# Patient Record
Sex: FEMALE | Race: WHITE | NOT HISPANIC OR LATINO | Employment: OTHER | URBAN - METROPOLITAN AREA
[De-identification: names, ages, dates, MRNs, and addresses within clinical notes are randomized per-mention and may not be internally consistent; named-entity substitution may affect disease eponyms.]

---

## 2019-02-19 ENCOUNTER — HOSPITAL ENCOUNTER (INPATIENT)
Facility: HOSPITAL | Age: 64
LOS: 2 days | Discharge: HOSPICE | DRG: 280 | End: 2019-02-21
Attending: EMERGENCY MEDICINE | Admitting: INTERNAL MEDICINE
Payer: MEDICARE

## 2019-02-19 DIAGNOSIS — I46.9 CARDIAC ARREST: Primary | ICD-10-CM

## 2019-02-19 DIAGNOSIS — R73.9 HYPERGLYCEMIA: ICD-10-CM

## 2019-02-19 DIAGNOSIS — R65.10 SIRS (SYSTEMIC INFLAMMATORY RESPONSE SYNDROME): ICD-10-CM

## 2019-02-19 DIAGNOSIS — I21.3 STEMI (ST ELEVATION MYOCARDIAL INFARCTION): ICD-10-CM

## 2019-02-19 DIAGNOSIS — E87.20 METABOLIC ACIDOSIS: ICD-10-CM

## 2019-02-19 DIAGNOSIS — R94.31 ABNORMAL ECG: ICD-10-CM

## 2019-02-19 DIAGNOSIS — A41.9 SEPSIS: ICD-10-CM

## 2019-02-19 DIAGNOSIS — R79.89 ELEVATED TROPONIN: ICD-10-CM

## 2019-02-19 DIAGNOSIS — Z45.2 ENCOUNTER FOR CENTRAL LINE PLACEMENT: ICD-10-CM

## 2019-02-19 DIAGNOSIS — J96.90 RESPIRATORY FAILURE, UNSPECIFIED CHRONICITY, UNSPECIFIED WHETHER WITH HYPOXIA OR HYPERCAPNIA: ICD-10-CM

## 2019-02-19 DIAGNOSIS — D64.9 ANEMIA, UNSPECIFIED TYPE: ICD-10-CM

## 2019-02-19 PROBLEM — D72.829 LEUCOCYTOSIS: Status: ACTIVE | Noted: 2019-02-19

## 2019-02-19 PROBLEM — G93.1 ACUTE ANOXIC ENCEPHALOPATHY: Status: ACTIVE | Noted: 2019-02-19

## 2019-02-19 LAB
ACANTHOCYTES BLD QL SMEAR: PRESENT
ALBUMIN SERPL BCP-MCNC: 2.9 G/DL
ALLENS TEST: ABNORMAL
ALP SERPL-CCNC: 154 U/L
ALT SERPL W/O P-5'-P-CCNC: 17 U/L
AMPHET+METHAMPHET UR QL: NEGATIVE
ANION GAP SERPL CALC-SCNC: 11 MMOL/L
ANION GAP SERPL CALC-SCNC: 17 MMOL/L
ANISOCYTOSIS BLD QL SMEAR: SLIGHT
APTT BLDCRRT: 21 SEC
AST SERPL-CCNC: 55 U/L
BACTERIA #/AREA URNS HPF: NORMAL /HPF
BARBITURATES UR QL SCN>200 NG/ML: NEGATIVE
BASOPHILS # BLD AUTO: 0.05 K/UL
BASOPHILS NFR BLD: 0.2 %
BENZODIAZ UR QL SCN>200 NG/ML: NEGATIVE
BILIRUB SERPL-MCNC: 0.2 MG/DL
BILIRUB UR QL STRIP: NEGATIVE
BNP SERPL-MCNC: 19 PG/ML
BUN SERPL-MCNC: 15 MG/DL
BUN SERPL-MCNC: 17 MG/DL
BURR CELLS BLD QL SMEAR: ABNORMAL
BZE UR QL SCN: NEGATIVE
CALCIUM SERPL-MCNC: 7.7 MG/DL
CALCIUM SERPL-MCNC: 9.4 MG/DL
CANNABINOIDS UR QL SCN: NEGATIVE
CHLORIDE SERPL-SCNC: 105 MMOL/L
CHLORIDE SERPL-SCNC: 109 MMOL/L
CK SERPL-CCNC: 263 U/L
CLARITY UR: CLEAR
CO2 SERPL-SCNC: 13 MMOL/L
CO2 SERPL-SCNC: 16 MMOL/L
COLOR UR: YELLOW
CREAT SERPL-MCNC: 0.9 MG/DL
CREAT SERPL-MCNC: 1 MG/DL
CREAT UR-MCNC: 119.8 MG/DL
DELSYS: ABNORMAL
DIFFERENTIAL METHOD: ABNORMAL
EOSINOPHIL # BLD AUTO: 0.1 K/UL
EOSINOPHIL NFR BLD: 0.3 %
ERYTHROCYTE [DISTWIDTH] IN BLOOD BY AUTOMATED COUNT: 19.3 %
ERYTHROCYTE [SEDIMENTATION RATE] IN BLOOD BY WESTERGREN METHOD: 18 MM/H
EST. GFR  (AFRICAN AMERICAN): >60 ML/MIN/1.73 M^2
EST. GFR  (AFRICAN AMERICAN): >60 ML/MIN/1.73 M^2
EST. GFR  (NON AFRICAN AMERICAN): >60 ML/MIN/1.73 M^2
EST. GFR  (NON AFRICAN AMERICAN): >60 ML/MIN/1.73 M^2
FIO2: 100
GLUCOSE SERPL-MCNC: 307 MG/DL
GLUCOSE SERPL-MCNC: 311 MG/DL
GLUCOSE SERPL-MCNC: 344 MG/DL
GLUCOSE UR QL STRIP: ABNORMAL
HCO3 UR-SCNC: 16.4 MMOL/L (ref 24–28)
HCT VFR BLD AUTO: 30.9 %
HGB BLD-MCNC: 8.2 G/DL
HGB UR QL STRIP: ABNORMAL
HYALINE CASTS #/AREA URNS LPF: 1 /LPF
HYPOCHROMIA BLD QL SMEAR: ABNORMAL
INR PPP: 1
KETONES UR QL STRIP: NEGATIVE
LACTATE SERPL-SCNC: 10.8 MMOL/L
LACTATE SERPL-SCNC: 4.2 MMOL/L
LEUKOCYTE ESTERASE UR QL STRIP: NEGATIVE
LYMPHOCYTES # BLD AUTO: 3.9 K/UL
LYMPHOCYTES NFR BLD: 15.9 %
MAGNESIUM SERPL-MCNC: 1.9 MG/DL
MCH RBC QN AUTO: 21.4 PG
MCHC RBC AUTO-ENTMCNC: 26.5 G/DL
MCV RBC AUTO: 81 FL
METHADONE UR QL SCN>300 NG/ML: NEGATIVE
MICROSCOPIC COMMENT: NORMAL
MODE: ABNORMAL
MONOCYTES # BLD AUTO: 0.9 K/UL
MONOCYTES NFR BLD: 3.7 %
NEUTROPHILS # BLD AUTO: 19.4 K/UL
NEUTROPHILS NFR BLD: 82.2 %
NITRITE UR QL STRIP: NEGATIVE
OPIATES UR QL SCN: NORMAL
OVALOCYTES BLD QL SMEAR: ABNORMAL
PCO2 BLDA: 58.6 MMHG (ref 35–45)
PCP UR QL SCN>25 NG/ML: NEGATIVE
PEEP: 5
PH SMN: 7.05 [PH] (ref 7.35–7.45)
PH UR STRIP: 6 [PH] (ref 5–8)
PHOSPHATE SERPL-MCNC: 3.4 MG/DL
PLATELET # BLD AUTO: 520 K/UL
PLATELET BLD QL SMEAR: ABNORMAL
PMV BLD AUTO: 9.4 FL
PO2 BLDA: 473 MMHG (ref 80–100)
POC BE: -14 MMOL/L
POC SATURATED O2: 100 % (ref 95–100)
POCT GLUCOSE: 320 MG/DL (ref 70–110)
POIKILOCYTOSIS BLD QL SMEAR: SLIGHT
POLYCHROMASIA BLD QL SMEAR: ABNORMAL
POTASSIUM SERPL-SCNC: 3.4 MMOL/L
POTASSIUM SERPL-SCNC: 5 MMOL/L
PROCALCITONIN SERPL IA-MCNC: 0.07 NG/ML
PROT SERPL-MCNC: 6.9 G/DL
PROT UR QL STRIP: ABNORMAL
PROTHROMBIN TIME: 10.7 SEC
RBC # BLD AUTO: 3.84 M/UL
RBC #/AREA URNS HPF: 3 /HPF (ref 0–4)
SAMPLE: ABNORMAL
SITE: ABNORMAL
SODIUM SERPL-SCNC: 135 MMOL/L
SODIUM SERPL-SCNC: 136 MMOL/L
SP GR UR STRIP: >=1.03 (ref 1–1.03)
SP02: 100
SQUAMOUS #/AREA URNS HPF: 6 /HPF
TOXICOLOGY INFORMATION: NORMAL
TROPONIN I SERPL DL<=0.01 NG/ML-MCNC: 0.68 NG/ML
TROPONIN I SERPL DL<=0.01 NG/ML-MCNC: 6.09 NG/ML
URN SPEC COLLECT METH UR: ABNORMAL
UROBILINOGEN UR STRIP-ACNC: NEGATIVE EU/DL
VT: 400
WBC # BLD AUTO: 24.28 K/UL
WBC #/AREA URNS HPF: 0 /HPF (ref 0–5)

## 2019-02-19 PROCEDURE — 85025 COMPLETE CBC W/AUTO DIFF WBC: CPT

## 2019-02-19 PROCEDURE — 96361 HYDRATE IV INFUSION ADD-ON: CPT

## 2019-02-19 PROCEDURE — 83605 ASSAY OF LACTIC ACID: CPT

## 2019-02-19 PROCEDURE — 84100 ASSAY OF PHOSPHORUS: CPT

## 2019-02-19 PROCEDURE — 80053 COMPREHEN METABOLIC PANEL: CPT

## 2019-02-19 PROCEDURE — 87040 BLOOD CULTURE FOR BACTERIA: CPT

## 2019-02-19 PROCEDURE — 80048 BASIC METABOLIC PNL TOTAL CA: CPT

## 2019-02-19 PROCEDURE — 85730 THROMBOPLASTIN TIME PARTIAL: CPT

## 2019-02-19 PROCEDURE — 99900035 HC TECH TIME PER 15 MIN (STAT)

## 2019-02-19 PROCEDURE — 87205 SMEAR GRAM STAIN: CPT

## 2019-02-19 PROCEDURE — 83880 ASSAY OF NATRIURETIC PEPTIDE: CPT

## 2019-02-19 PROCEDURE — 27100108

## 2019-02-19 PROCEDURE — 96366 THER/PROPH/DIAG IV INF ADDON: CPT

## 2019-02-19 PROCEDURE — 93010 EKG 12-LEAD: ICD-10-PCS | Mod: 76,,, | Performed by: INTERNAL MEDICINE

## 2019-02-19 PROCEDURE — 36600 WITHDRAWAL OF ARTERIAL BLOOD: CPT

## 2019-02-19 PROCEDURE — 81000 URINALYSIS NONAUTO W/SCOPE: CPT | Mod: 59

## 2019-02-19 PROCEDURE — 36415 COLL VENOUS BLD VENIPUNCTURE: CPT

## 2019-02-19 PROCEDURE — 83735 ASSAY OF MAGNESIUM: CPT

## 2019-02-19 PROCEDURE — 96365 THER/PROPH/DIAG IV INF INIT: CPT

## 2019-02-19 PROCEDURE — 63600175 PHARM REV CODE 636 W HCPCS

## 2019-02-19 PROCEDURE — 63600175 PHARM REV CODE 636 W HCPCS: Performed by: EMERGENCY MEDICINE

## 2019-02-19 PROCEDURE — 36620 INSERTION CATHETER ARTERY: CPT

## 2019-02-19 PROCEDURE — 80307 DRUG TEST PRSMV CHEM ANLYZR: CPT

## 2019-02-19 PROCEDURE — 85610 PROTHROMBIN TIME: CPT

## 2019-02-19 PROCEDURE — 82803 BLOOD GASES ANY COMBINATION: CPT

## 2019-02-19 PROCEDURE — 84484 ASSAY OF TROPONIN QUANT: CPT | Mod: 91

## 2019-02-19 PROCEDURE — 25000003 PHARM REV CODE 250: Performed by: EMERGENCY MEDICINE

## 2019-02-19 PROCEDURE — 82550 ASSAY OF CK (CPK): CPT

## 2019-02-19 PROCEDURE — 83605 ASSAY OF LACTIC ACID: CPT | Mod: 91

## 2019-02-19 PROCEDURE — 93010 ELECTROCARDIOGRAM REPORT: CPT | Mod: 76,,, | Performed by: INTERNAL MEDICINE

## 2019-02-19 PROCEDURE — 31500 INSERT EMERGENCY AIRWAY: CPT

## 2019-02-19 PROCEDURE — 94002 VENT MGMT INPAT INIT DAY: CPT

## 2019-02-19 PROCEDURE — 11000001 HC ACUTE MED/SURG PRIVATE ROOM

## 2019-02-19 PROCEDURE — 93010 ELECTROCARDIOGRAM REPORT: CPT | Mod: ,,, | Performed by: INTERNAL MEDICINE

## 2019-02-19 PROCEDURE — 25000003 PHARM REV CODE 250

## 2019-02-19 PROCEDURE — 27201258 HC MOISTURE TRAP-END TIDAL C02

## 2019-02-19 PROCEDURE — 84145 PROCALCITONIN (PCT): CPT

## 2019-02-19 PROCEDURE — 83520 IMMUNOASSAY QUANT NOS NONAB: CPT

## 2019-02-19 PROCEDURE — 82947 ASSAY GLUCOSE BLOOD QUANT: CPT

## 2019-02-19 PROCEDURE — 27200966 HC CLOSED SUCTION SYSTEM

## 2019-02-19 PROCEDURE — 96375 TX/PRO/DX INJ NEW DRUG ADDON: CPT

## 2019-02-19 PROCEDURE — 87070 CULTURE OTHR SPECIMN AEROBIC: CPT

## 2019-02-19 PROCEDURE — 99285 EMERGENCY DEPT VISIT HI MDM: CPT | Mod: 25

## 2019-02-19 RX ORDER — ETOMIDATE 2 MG/ML
20 INJECTION INTRAVENOUS
Status: COMPLETED | OUTPATIENT
Start: 2019-02-19 | End: 2019-02-19

## 2019-02-19 RX ORDER — FENTANYL CITRAT/DEXTROSE 5%/PF 100 MCG/10
50 PATIENT CONTROLLED ANALGESIA SYRINGE INTRAVENOUS CONTINUOUS
Status: DISCONTINUED | OUTPATIENT
Start: 2019-02-19 | End: 2019-02-20

## 2019-02-19 RX ORDER — ASPIRIN 325 MG
325 TABLET ORAL
Status: COMPLETED | OUTPATIENT
Start: 2019-02-19 | End: 2019-02-19

## 2019-02-19 RX ORDER — CIPROFLOXACIN 2 MG/ML
400 INJECTION, SOLUTION INTRAVENOUS
Status: DISCONTINUED | OUTPATIENT
Start: 2019-02-19 | End: 2019-02-20

## 2019-02-19 RX ORDER — MAGNESIUM SULFATE HEPTAHYDRATE 40 MG/ML
0.5 INJECTION, SOLUTION INTRAVENOUS CONTINUOUS
Status: DISCONTINUED | OUTPATIENT
Start: 2019-02-19 | End: 2019-02-19

## 2019-02-19 RX ORDER — BUSPIRONE HYDROCHLORIDE 10 MG/1
30 TABLET ORAL EVERY 8 HOURS
Status: DISCONTINUED | OUTPATIENT
Start: 2019-02-19 | End: 2019-02-20

## 2019-02-19 RX ORDER — CISATRACURIUM BESYLATE 10 MG/ML
0.1 INJECTION, SOLUTION INTRAVENOUS ONCE
Status: DISCONTINUED | OUTPATIENT
Start: 2019-02-19 | End: 2019-02-20 | Stop reason: ALTCHOICE

## 2019-02-19 RX ORDER — PROPOFOL 10 MG/ML
30 INJECTION, EMULSION INTRAVENOUS CONTINUOUS
Status: DISCONTINUED | OUTPATIENT
Start: 2019-02-19 | End: 2019-02-20

## 2019-02-19 RX ORDER — SUCCINYLCHOLINE CHLORIDE 20 MG/ML
100 INJECTION INTRAMUSCULAR; INTRAVENOUS
Status: COMPLETED | OUTPATIENT
Start: 2019-02-19 | End: 2019-02-19

## 2019-02-19 RX ORDER — MAGNESIUM SULFATE HEPTAHYDRATE 40 MG/ML
2 INJECTION, SOLUTION INTRAVENOUS
Status: COMPLETED | OUTPATIENT
Start: 2019-02-19 | End: 2019-02-20

## 2019-02-19 RX ORDER — ACETAMINOPHEN 650 MG/20.3ML
650 LIQUID ORAL EVERY 6 HOURS
Status: DISCONTINUED | OUTPATIENT
Start: 2019-02-20 | End: 2019-02-20

## 2019-02-19 RX ORDER — BUSPIRONE HYDROCHLORIDE 10 MG/1
30 TABLET ORAL ONCE
Status: COMPLETED | OUTPATIENT
Start: 2019-02-19 | End: 2019-02-19

## 2019-02-19 RX ADMIN — PROPOFOL 30 MCG/KG/MIN: 10 INJECTION, EMULSION INTRAVENOUS at 11:02

## 2019-02-19 RX ADMIN — ETOMIDATE 20 MG: 2 INJECTION INTRAVENOUS at 07:02

## 2019-02-19 RX ADMIN — ASPIRIN 325 MG ORAL TABLET 325 MG: 325 PILL ORAL at 11:02

## 2019-02-19 RX ADMIN — ACETAMINOPHEN 650 MG: 650 SOLUTION ORAL at 11:02

## 2019-02-19 RX ADMIN — SUCCINYLCHOLINE CHLORIDE 100 MG: 20 INJECTION, SOLUTION INTRAMUSCULAR; INTRAVENOUS at 07:02

## 2019-02-19 RX ADMIN — SODIUM CHLORIDE 3000 ML: 0.9 INJECTION, SOLUTION INTRAVENOUS at 08:02

## 2019-02-19 RX ADMIN — VANCOMYCIN HYDROCHLORIDE 1500 MG: 1 INJECTION, POWDER, LYOPHILIZED, FOR SOLUTION INTRAVENOUS at 10:02

## 2019-02-19 RX ADMIN — CIPROFLOXACIN 400 MG: 2 INJECTION, SOLUTION INTRAVENOUS at 09:02

## 2019-02-19 RX ADMIN — LORAZEPAM 2 MG: 2 INJECTION INTRAMUSCULAR; INTRAVENOUS at 10:02

## 2019-02-19 RX ADMIN — PIPERACILLIN AND TAZOBACTAM 4.5 G: 4; .5 INJECTION, POWDER, LYOPHILIZED, FOR SOLUTION INTRAVENOUS; PARENTERAL at 09:02

## 2019-02-19 RX ADMIN — BUSPIRONE HYDROCHLORIDE 30 MG: 10 TABLET ORAL at 10:02

## 2019-02-19 RX ADMIN — Medication 50 MCG/HR: at 10:02

## 2019-02-20 PROBLEM — Z99.11 ON MECHANICALLY ASSISTED VENTILATION: Status: ACTIVE | Noted: 2019-02-20

## 2019-02-20 LAB
ABO + RH BLD: NORMAL
ACANTHOCYTES BLD QL SMEAR: PRESENT
ALBUMIN SERPL BCP-MCNC: 2.3 G/DL
ALLENS TEST: ABNORMAL
ALP SERPL-CCNC: 107 U/L
ALT SERPL W/O P-5'-P-CCNC: 32 U/L
ANION GAP SERPL CALC-SCNC: 5 MMOL/L
ANION GAP SERPL CALC-SCNC: 5 MMOL/L
ANION GAP SERPL CALC-SCNC: 8 MMOL/L
ANION GAP SERPL CALC-SCNC: 9 MMOL/L
ANISOCYTOSIS BLD QL SMEAR: SLIGHT
APTT BLDCRRT: 35.6 SEC
APTT BLDCRRT: 74.3 SEC
APTT BLDCRRT: 78.6 SEC
APTT BLDCRRT: 78.6 SEC
AST SERPL-CCNC: 103 U/L
BASOPHILS # BLD AUTO: 0.01 K/UL
BASOPHILS NFR BLD: 0 %
BILIRUB SERPL-MCNC: 0.2 MG/DL
BLD GP AB SCN CELLS X3 SERPL QL: NORMAL
BLD PROD TYP BPU: NORMAL
BLOOD UNIT EXPIRATION DATE: NORMAL
BLOOD UNIT TYPE CODE: 6200
BLOOD UNIT TYPE: NORMAL
BUN SERPL-MCNC: 17 MG/DL
BUN SERPL-MCNC: 17 MG/DL
BUN SERPL-MCNC: 18 MG/DL
BUN SERPL-MCNC: 18 MG/DL
BURR CELLS BLD QL SMEAR: ABNORMAL
CALCIUM SERPL-MCNC: 7.7 MG/DL
CALCIUM SERPL-MCNC: 7.7 MG/DL
CALCIUM SERPL-MCNC: 8.1 MG/DL
CALCIUM SERPL-MCNC: 8.5 MG/DL
CHLORIDE SERPL-SCNC: 110 MMOL/L
CHLORIDE SERPL-SCNC: 111 MMOL/L
CHLORIDE SERPL-SCNC: 111 MMOL/L
CHLORIDE SERPL-SCNC: 112 MMOL/L
CO2 SERPL-SCNC: 16 MMOL/L
CO2 SERPL-SCNC: 16 MMOL/L
CO2 SERPL-SCNC: 17 MMOL/L
CO2 SERPL-SCNC: 17 MMOL/L
CODING SYSTEM: NORMAL
CREAT SERPL-MCNC: 0.6 MG/DL
CREAT SERPL-MCNC: 0.6 MG/DL
CREAT SERPL-MCNC: 0.8 MG/DL
CREAT SERPL-MCNC: 0.8 MG/DL
DACRYOCYTES BLD QL SMEAR: ABNORMAL
DELSYS: ABNORMAL
DIASTOLIC DYSFUNCTION: NO
DIFFERENTIAL METHOD: ABNORMAL
DISPENSE STATUS: NORMAL
EOSINOPHIL # BLD AUTO: 0 K/UL
EOSINOPHIL NFR BLD: 0 %
ERYTHROCYTE [DISTWIDTH] IN BLOOD BY AUTOMATED COUNT: 18.9 %
ERYTHROCYTE [SEDIMENTATION RATE] IN BLOOD BY WESTERGREN METHOD: 22 MM/H
EST. GFR  (AFRICAN AMERICAN): >60 ML/MIN/1.73 M^2
EST. GFR  (NON AFRICAN AMERICAN): >60 ML/MIN/1.73 M^2
ESTIMATED PA SYSTOLIC PRESSURE: 41.83
FERRITIN SERPL-MCNC: 42 NG/ML
FIO2: 50
GIANT PLATELETS BLD QL SMEAR: PRESENT
GLUCOSE SERPL-MCNC: 110 MG/DL
GLUCOSE SERPL-MCNC: 119 MG/DL
GLUCOSE SERPL-MCNC: 119 MG/DL
GLUCOSE SERPL-MCNC: 148 MG/DL
HCO3 UR-SCNC: 16.4 MMOL/L (ref 24–28)
HCT VFR BLD AUTO: 26.6 %
HGB BLD-MCNC: 7.4 G/DL
HGB BLD-MCNC: 7.7 G/DL
HYPOCHROMIA BLD QL SMEAR: ABNORMAL
INR PPP: 1.1
IRON SERPL-MCNC: 20 UG/DL
LACTATE SERPL-SCNC: 1.9 MMOL/L
LACTATE SERPL-SCNC: 4 MMOL/L
LYMPHOCYTES # BLD AUTO: 0.7 K/UL
LYMPHOCYTES NFR BLD: 3.5 %
MAGNESIUM SERPL-MCNC: 1.9 MG/DL
MAGNESIUM SERPL-MCNC: 2.2 MG/DL
MAGNESIUM SERPL-MCNC: 2.5 MG/DL
MCH RBC QN AUTO: 22.5 PG
MCHC RBC AUTO-ENTMCNC: 28.9 G/DL
MCV RBC AUTO: 78 FL
MITRAL VALVE REGURGITATION: ABNORMAL
MODE: ABNORMAL
MONOCYTES # BLD AUTO: 0.9 K/UL
MONOCYTES NFR BLD: 4.2 %
NEUTROPHILS # BLD AUTO: 19.3 K/UL
NEUTROPHILS NFR BLD: 92.6 %
NUM UNITS TRANS PACKED RBC: NORMAL
OVALOCYTES BLD QL SMEAR: ABNORMAL
PCO2 BLDA: 38.2 MMHG (ref 35–45)
PEEP: 5
PH SMN: 7.24 [PH] (ref 7.35–7.45)
PHOSPHATE SERPL-MCNC: 2.3 MG/DL
PHOSPHATE SERPL-MCNC: 3.2 MG/DL
PHOSPHATE SERPL-MCNC: 3.7 MG/DL
PLATELET # BLD AUTO: 454 K/UL
PLATELET BLD QL SMEAR: ABNORMAL
PMV BLD AUTO: 8.4 FL
PO2 BLDA: 172 MMHG (ref 80–100)
POC BE: -11 MMOL/L
POC SATURATED O2: 99 % (ref 95–100)
POCT GLUCOSE: 102 MG/DL (ref 70–110)
POCT GLUCOSE: 107 MG/DL (ref 70–110)
POCT GLUCOSE: 108 MG/DL (ref 70–110)
POCT GLUCOSE: 110 MG/DL (ref 70–110)
POCT GLUCOSE: 118 MG/DL (ref 70–110)
POCT GLUCOSE: 144 MG/DL (ref 70–110)
POCT GLUCOSE: 175 MG/DL (ref 70–110)
POIKILOCYTOSIS BLD QL SMEAR: ABNORMAL
POLYCHROMASIA BLD QL SMEAR: ABNORMAL
POTASSIUM SERPL-SCNC: 3 MMOL/L
POTASSIUM SERPL-SCNC: 3.8 MMOL/L
POTASSIUM SERPL-SCNC: 3.8 MMOL/L
POTASSIUM SERPL-SCNC: 3.9 MMOL/L
PROT SERPL-MCNC: 5.1 G/DL
PROTHROMBIN TIME: 11.9 SEC
RBC # BLD AUTO: 3.42 M/UL
RETIRED EF AND QEF - SEE NOTES: 55 (ref 55–65)
SAMPLE: ABNORMAL
SATURATED IRON: 6 %
SITE: ABNORMAL
SODIUM SERPL-SCNC: 133 MMOL/L
SODIUM SERPL-SCNC: 133 MMOL/L
SODIUM SERPL-SCNC: 135 MMOL/L
SODIUM SERPL-SCNC: 136 MMOL/L
TARGETS BLD QL SMEAR: ABNORMAL
TOTAL IRON BINDING CAPACITY: 323 UG/DL
TRANSFERRIN SERPL-MCNC: 218 MG/DL
TRICUSPID VALVE REGURGITATION: ABNORMAL
VT: 400
WBC # BLD AUTO: 20.9 K/UL

## 2019-02-20 PROCEDURE — 83605 ASSAY OF LACTIC ACID: CPT

## 2019-02-20 PROCEDURE — 63600175 PHARM REV CODE 636 W HCPCS: Performed by: EMERGENCY MEDICINE

## 2019-02-20 PROCEDURE — 99223 PR INITIAL HOSPITAL CARE,LEVL III: ICD-10-PCS | Mod: ,,, | Performed by: INTERNAL MEDICINE

## 2019-02-20 PROCEDURE — 99900035 HC TECH TIME PER 15 MIN (STAT)

## 2019-02-20 PROCEDURE — 93010 ELECTROCARDIOGRAM REPORT: CPT | Mod: ,,, | Performed by: INTERNAL MEDICINE

## 2019-02-20 PROCEDURE — S0028 INJECTION, FAMOTIDINE, 20 MG: HCPCS | Performed by: NURSE PRACTITIONER

## 2019-02-20 PROCEDURE — 99199 UNLISTED SPECIAL SVC PX/RPRT: CPT

## 2019-02-20 PROCEDURE — 94003 VENT MGMT INPAT SUBQ DAY: CPT

## 2019-02-20 PROCEDURE — 93010 EKG 12-LEAD: ICD-10-PCS | Mod: ,,, | Performed by: INTERNAL MEDICINE

## 2019-02-20 PROCEDURE — 99291 CRITICAL CARE FIRST HOUR: CPT | Mod: ,,, | Performed by: NURSE PRACTITIONER

## 2019-02-20 PROCEDURE — 86920 COMPATIBILITY TEST SPIN: CPT

## 2019-02-20 PROCEDURE — 63600175 PHARM REV CODE 636 W HCPCS: Performed by: INTERNAL MEDICINE

## 2019-02-20 PROCEDURE — 80048 BASIC METABOLIC PNL TOTAL CA: CPT

## 2019-02-20 PROCEDURE — 93306 2D ECHO WITH COLOR FLOW DOPPLER: ICD-10-PCS | Mod: 26,,, | Performed by: INTERNAL MEDICINE

## 2019-02-20 PROCEDURE — 63600175 PHARM REV CODE 636 W HCPCS: Performed by: STUDENT IN AN ORGANIZED HEALTH CARE EDUCATION/TRAINING PROGRAM

## 2019-02-20 PROCEDURE — 25000003 PHARM REV CODE 250: Performed by: STUDENT IN AN ORGANIZED HEALTH CARE EDUCATION/TRAINING PROGRAM

## 2019-02-20 PROCEDURE — 80053 COMPREHEN METABOLIC PANEL: CPT

## 2019-02-20 PROCEDURE — 93306 TTE W/DOPPLER COMPLETE: CPT | Mod: 26,,, | Performed by: INTERNAL MEDICINE

## 2019-02-20 PROCEDURE — 20000000 HC ICU ROOM

## 2019-02-20 PROCEDURE — 85610 PROTHROMBIN TIME: CPT

## 2019-02-20 PROCEDURE — 85730 THROMBOPLASTIN TIME PARTIAL: CPT | Mod: 91

## 2019-02-20 PROCEDURE — 82728 ASSAY OF FERRITIN: CPT

## 2019-02-20 PROCEDURE — 97802 MEDICAL NUTRITION INDIV IN: CPT

## 2019-02-20 PROCEDURE — 85018 HEMOGLOBIN: CPT

## 2019-02-20 PROCEDURE — 85025 COMPLETE CBC W/AUTO DIFF WBC: CPT

## 2019-02-20 PROCEDURE — 37799 UNLISTED PX VASCULAR SURGERY: CPT

## 2019-02-20 PROCEDURE — 93306 TTE W/DOPPLER COMPLETE: CPT

## 2019-02-20 PROCEDURE — 99291 PR CRITICAL CARE, E/M 30-74 MINUTES: ICD-10-PCS | Mod: ,,, | Performed by: NURSE PRACTITIONER

## 2019-02-20 PROCEDURE — 84100 ASSAY OF PHOSPHORUS: CPT

## 2019-02-20 PROCEDURE — 85730 THROMBOPLASTIN TIME PARTIAL: CPT

## 2019-02-20 PROCEDURE — 83540 ASSAY OF IRON: CPT

## 2019-02-20 PROCEDURE — 93010 ELECTROCARDIOGRAM REPORT: CPT | Mod: 76,,, | Performed by: INTERNAL MEDICINE

## 2019-02-20 PROCEDURE — 82803 BLOOD GASES ANY COMBINATION: CPT

## 2019-02-20 PROCEDURE — 93005 ELECTROCARDIOGRAM TRACING: CPT

## 2019-02-20 PROCEDURE — 25000003 PHARM REV CODE 250: Performed by: INTERNAL MEDICINE

## 2019-02-20 PROCEDURE — 25000003 PHARM REV CODE 250: Performed by: NURSE PRACTITIONER

## 2019-02-20 PROCEDURE — 99223 1ST HOSP IP/OBS HIGH 75: CPT | Mod: ,,, | Performed by: INTERNAL MEDICINE

## 2019-02-20 PROCEDURE — 83735 ASSAY OF MAGNESIUM: CPT

## 2019-02-20 PROCEDURE — 86850 RBC ANTIBODY SCREEN: CPT

## 2019-02-20 PROCEDURE — 25000003 PHARM REV CODE 250: Performed by: EMERGENCY MEDICINE

## 2019-02-20 RX ORDER — CHLORHEXIDINE GLUCONATE ORAL RINSE 1.2 MG/ML
15 SOLUTION DENTAL 2 TIMES DAILY
Status: DISCONTINUED | OUTPATIENT
Start: 2019-02-20 | End: 2019-02-20

## 2019-02-20 RX ORDER — HEPARIN SODIUM,PORCINE/D5W 25000/250
12 INTRAVENOUS SOLUTION INTRAVENOUS CONTINUOUS
Status: DISCONTINUED | OUTPATIENT
Start: 2019-02-20 | End: 2019-02-20

## 2019-02-20 RX ORDER — MORPHINE SULFATE 4 MG/ML
4 INJECTION, SOLUTION INTRAMUSCULAR; INTRAVENOUS
Status: DISCONTINUED | OUTPATIENT
Start: 2019-02-20 | End: 2019-02-21 | Stop reason: HOSPADM

## 2019-02-20 RX ORDER — VANCOMYCIN HCL IN 5 % DEXTROSE 1G/250ML
1000 PLASTIC BAG, INJECTION (ML) INTRAVENOUS
Status: DISCONTINUED | OUTPATIENT
Start: 2019-02-20 | End: 2019-02-20

## 2019-02-20 RX ORDER — SODIUM CHLORIDE 0.9 % (FLUSH) 0.9 %
5 SYRINGE (ML) INJECTION
Status: DISCONTINUED | OUTPATIENT
Start: 2019-02-20 | End: 2019-02-21 | Stop reason: HOSPADM

## 2019-02-20 RX ORDER — SODIUM CHLORIDE 0.9 % (FLUSH) 0.9 %
5 SYRINGE (ML) INJECTION
Status: DISCONTINUED | OUTPATIENT
Start: 2019-02-20 | End: 2019-02-20

## 2019-02-20 RX ORDER — ATORVASTATIN CALCIUM 40 MG/1
40 TABLET, FILM COATED ORAL NIGHTLY
Status: DISCONTINUED | OUTPATIENT
Start: 2019-02-20 | End: 2019-02-20

## 2019-02-20 RX ORDER — CEFEPIME HYDROCHLORIDE 1 G/50ML
1 INJECTION, SOLUTION INTRAVENOUS
Status: DISCONTINUED | OUTPATIENT
Start: 2019-02-20 | End: 2019-02-20

## 2019-02-20 RX ORDER — NOREPINEPHRINE BITARTRATE/D5W 4MG/250ML
0.05 PLASTIC BAG, INJECTION (ML) INTRAVENOUS CONTINUOUS
Status: DISCONTINUED | OUTPATIENT
Start: 2019-02-20 | End: 2019-02-20

## 2019-02-20 RX ORDER — ASPIRIN 81 MG/1
81 TABLET ORAL DAILY
Status: DISCONTINUED | OUTPATIENT
Start: 2019-02-20 | End: 2019-02-20

## 2019-02-20 RX ORDER — NAPROXEN SODIUM 220 MG/1
81 TABLET, FILM COATED ORAL DAILY
Status: DISCONTINUED | OUTPATIENT
Start: 2019-02-20 | End: 2019-02-20

## 2019-02-20 RX ORDER — POTASSIUM CHLORIDE 14.9 MG/ML
20 INJECTION INTRAVENOUS ONCE
Status: COMPLETED | OUTPATIENT
Start: 2019-02-20 | End: 2019-02-20

## 2019-02-20 RX ORDER — HYDROCODONE BITARTRATE AND ACETAMINOPHEN 500; 5 MG/1; MG/1
TABLET ORAL
Status: DISCONTINUED | OUTPATIENT
Start: 2019-02-20 | End: 2019-02-20

## 2019-02-20 RX ORDER — GLYCOPYRROLATE 0.2 MG/ML
0.1 INJECTION INTRAMUSCULAR; INTRAVENOUS ONCE AS NEEDED
Status: COMPLETED | OUTPATIENT
Start: 2019-02-20 | End: 2019-02-20

## 2019-02-20 RX ORDER — FAMOTIDINE 10 MG/ML
20 INJECTION INTRAVENOUS 2 TIMES DAILY
Status: DISCONTINUED | OUTPATIENT
Start: 2019-02-20 | End: 2019-02-20

## 2019-02-20 RX ADMIN — MORPHINE SULFATE 4 MG: 4 INJECTION INTRAVENOUS at 10:02

## 2019-02-20 RX ADMIN — POTASSIUM PHOSPHATE, MONOBASIC AND POTASSIUM PHOSPHATE, DIBASIC 15 MMOL: 224; 236 INJECTION, SOLUTION, CONCENTRATE INTRAVENOUS at 06:02

## 2019-02-20 RX ADMIN — CEFEPIME HYDROCHLORIDE 1 G: 1 INJECTION, SOLUTION INTRAVENOUS at 06:02

## 2019-02-20 RX ADMIN — CIPROFLOXACIN 400 MG: 2 INJECTION, SOLUTION INTRAVENOUS at 08:02

## 2019-02-20 RX ADMIN — PROPOFOL 30 MCG/KG/MIN: 10 INJECTION, EMULSION INTRAVENOUS at 04:02

## 2019-02-20 RX ADMIN — NOREPINEPHRINE BITARTRATE 0.05 MCG/KG/MIN: 4 SOLUTION at 07:02

## 2019-02-20 RX ADMIN — PROPOFOL 30 MCG/KG/MIN: 10 INJECTION, EMULSION INTRAVENOUS at 12:02

## 2019-02-20 RX ADMIN — VANCOMYCIN HYDROCHLORIDE 1000 MG: 1 INJECTION, POWDER, FOR SOLUTION INTRAVENOUS at 11:02

## 2019-02-20 RX ADMIN — INSULIN HUMAN 10 UNITS: 100 INJECTION, SOLUTION PARENTERAL at 12:02

## 2019-02-20 RX ADMIN — LORAZEPAM 2 MG: 2 INJECTION INTRAMUSCULAR; INTRAVENOUS at 10:02

## 2019-02-20 RX ADMIN — SODIUM CHLORIDE 1000 ML: 0.9 INJECTION, SOLUTION INTRAVENOUS at 11:02

## 2019-02-20 RX ADMIN — ACETAMINOPHEN 650 MG: 650 SOLUTION ORAL at 05:02

## 2019-02-20 RX ADMIN — MAGNESIUM SULFATE IN WATER 2 G: 40 INJECTION, SOLUTION INTRAVENOUS at 07:02

## 2019-02-20 RX ADMIN — POTASSIUM CHLORIDE 20 MEQ: 14.9 INJECTION, SOLUTION INTRAVENOUS at 06:02

## 2019-02-20 RX ADMIN — MORPHINE SULFATE 4 MG: 4 INJECTION INTRAVENOUS at 07:02

## 2019-02-20 RX ADMIN — HEPARIN SODIUM AND DEXTROSE 12 UNITS/KG/HR: 10000; 5 INJECTION INTRAVENOUS at 01:02

## 2019-02-20 RX ADMIN — ACETAMINOPHEN 650 MG: 650 SOLUTION ORAL at 11:02

## 2019-02-20 RX ADMIN — CHLORHEXIDINE GLUCONATE 15 ML: 1.2 RINSE ORAL at 11:02

## 2019-02-20 RX ADMIN — LORAZEPAM 2 MG: 2 INJECTION INTRAMUSCULAR; INTRAVENOUS at 07:02

## 2019-02-20 RX ADMIN — FAMOTIDINE 20 MG: 10 INJECTION INTRAVENOUS at 11:02

## 2019-02-20 RX ADMIN — PROPOFOL 30 MCG/KG/MIN: 10 INJECTION, EMULSION INTRAVENOUS at 03:02

## 2019-02-20 RX ADMIN — LORAZEPAM 2 MG: 2 INJECTION INTRAMUSCULAR; INTRAVENOUS at 06:02

## 2019-02-20 RX ADMIN — LORAZEPAM 2 MG: 2 INJECTION INTRAMUSCULAR; INTRAVENOUS at 09:02

## 2019-02-20 RX ADMIN — GLYCOPYRROLATE 0.1 MG: 0.2 INJECTION INTRAMUSCULAR; INTRAVENOUS at 09:02

## 2019-02-20 RX ADMIN — ASPIRIN 81 MG CHEWABLE TABLET 81 MG: 81 TABLET CHEWABLE at 12:02

## 2019-02-20 RX ADMIN — MORPHINE SULFATE 4 MG: 4 INJECTION INTRAVENOUS at 06:02

## 2019-02-20 NOTE — CONSULTS
"  [facility]  Adult Nutrition  Consult Note    SUMMARY     Recommendations    Recommendation/Intervention: 1. When medically able, initiate TF of Nutren 1.5 @ 40 ml/hr  + 1 pack of beneprotein TID w/ flushes as needed per MD/NP.  With current propofol infusion, provides 1974 kcal, 83 g pro and 733 ml free water. 2. Check residuals Q4 hours. Hold if > 300 cc. 3. Will continue to monitor.   Intervention: Coordination of care w/ NP  Goals: Meet > 85 % EEN/EPN while admitted  Nutrition Goal Status: new  Communication of RD Recs: (POc, sticky note)    Reason for Assessment    Reason For Assessment: consult   Dx:  1. Cardiac arrest    2. STEMI (ST elevation myocardial infarction)    3. Abnormal ECG    4. Elevated troponin    5. Anemia, unspecified type    6. Metabolic acidosis    7. Respiratory failure, unspecified chronicity, unspecified whether with hypoxia or hypercapnia    8. Encounter for central line placement    9. SIRS (systemic inflammatory response syndrome)    10. Hyperglycemia    11. Sepsis      History reviewed. No pertinent past medical history.  General info comments: Pt currently in ICU, intubated and sedated (on propofol). Per epic records, no wt hx. No family members at bedside. Per NFPE 2.20.19, no muscle wasting/ fat depletion noted. Reviewed nutrition recs w/ NP this am.   Discharge plan: pending medical course     Nutrition Risk Screen    Nutrition Risk Screen: other (see comments)(intubated)    Nutrition/Diet History    Spiritual, Cultural Beliefs, Gnosticist Practices, Values that Affect Care: no    Anthropometrics    Temp: (!) 93.6 °F (34.2 °C)  Height Method: Estimated  Height: 5' 1" (154.9 cm)  Height (inches): 61 in  Weight Method: Bed Scale  Weight: 96.9 kg (213 lb 11.2 oz)  Weight (lb): 213.7 lb  Ideal Body Weight (IBW), Female: 105 lb  % Ideal Body Weight, Female (lb): 203.52 lb  BMI (Calculated): 40.5  BMI Grade: greater than 40 - morbid obesity   "     Lab/Procedures/Meds    Pertinent Labs Reviewed: reviewed  BMP  Lab Results   Component Value Date     (L) 02/20/2019    K 3.9 02/20/2019     02/20/2019    CO2 16 (L) 02/20/2019    BUN 18 02/20/2019    CREATININE 0.8 02/20/2019    CALCIUM 8.5 (L) 02/20/2019    ANIONGAP 9 02/20/2019    ESTGFRAFRICA >60 02/20/2019    EGFRNONAA >60 02/20/2019     Lab Results   Component Value Date    CALCIUM 8.5 (L) 02/20/2019    PHOS 3.7 02/20/2019     Lab Results   Component Value Date    ALBUMIN 2.9 (L) 02/19/2019     Recent Labs   Lab 02/20/19  1013   POCTGLUCOSE 118*       Pertinent Medications Reviewed: reviewed    Physical Findings/Assessment     skin: dylan score 9    Estimated/Assessed Needs    Weight Used For Calorie Calculations: 96.9 kg (213 lb 10 oz)   Energy Requirements: 1922 kcal  Energy Need Method: [place] (modified)  Protein Requirement: 80- 119 g     Weight Used For Protein Calculations: 47.6 kg (105 lb)(IBW - obese ICU)   Fluid requirements: RDA method or per MD  RDA method: 1922 ml                Nutrition Prescription Ordered    Nutrition Order Comments: NPO    Evaluation of Received Nutrient/Fluid Intake    Other Calories (kcal): 459.36  (propofol @ 17.4 ml/hr)  % Intake of Estimated Energy Needs: 0 - 25 %  % Meal Intake: NPO    Nutrition Risk      2xweekly    Assessment and Plan      Nutrition Problem  Inadequate energy intake     Related to (etiology):   NPO, no alternative means of nutrition     Signs and Symptoms (as evidenced by):   Meeting < 85 % EEN/EPN     Interventions/Recommendations (treatment strategy):  See above     Nutrition Diagnosis Status:   New        Monitor and Evaluation    Food and Nutrient Intake: energy intake, enteral nutrition intake  Food and Nutrient Adminstration: enteral and parenteral nutrition administration  Anthropometric Measurements: weight  Biochemical Data, Medical Tests and Procedures: electrolyte and renal panel, glucose/endocrine  profile  Nutrition-Focused Physical Findings: overall appearance                 Nutrition Follow-Up    RD Follow-up?: Yes

## 2019-02-20 NOTE — HPI
64 y/o F with only known PMH chronic pain (Pain Management)   Presented to ED via EMS for witnessed arrest at home with estimated time before ROSC ~25 min. Per account, brother immediately started CPR, FD continued CPR on arrival; prior to hospital patient received 3 difibrillations, (with the 3rd achieving ROSC); Epinephine x 4; Amiodarone 300 mg; Narcan 2 mg; and a Lucho Airway.    Patient was later intubated in the ED with an 8.0 ETT and central line was placed. CT Brain no acute findings. EKG read STEMI. WBC 24K, Trop 0.684 then bumped to 6.088,   Cardiology felt not STEMI, heparin infusion for NSTEMI initiated

## 2019-02-20 NOTE — PLAN OF CARE
Problem: Adult Inpatient Plan of Care  Goal: Plan of Care Review  Outcome: Ongoing (interventions implemented as appropriate)   02/20/19 7606   Plan of Care Review   Plan of Care Reviewed With patient   Progress no change       Comments: Hypothermia protocol initiated in ER after cardiac arrest at home, target temp achieved, rass goal maintained, heparin gtt started at 0145, ludwig to criticore, sb with frequent pvc's, right ij tlc, left radial a line.

## 2019-02-20 NOTE — CONSULTS
[facility]   Critical Care Medicine  Consult Note    Patient Name: Elenita Degroot  MRN: [id number]  Admission Date: 2/19/2019  Hospital Length of Stay: 1 days  Code Status: DNR  Attending Physician: Warner Whyte MD   Primary Care Provider: Provider Notinsystem   Principal Problem: Cardiac arrest      Subjective:     HPI:  62 y/o F with only known PMH chronic pain (Pain Management)   Presented to ED via EMS for witnessed arrest at home with estimated time before ROSC ~25 min. Per account, brother immediately started CPR, FD continued CPR on arrival; prior to hospital patient received 3 difibrillations, (with the 3rd achieving ROSC); Epinephine x 4; Amiodarone 300 mg; Narcan 2 mg; and a Lucho Airway.    Patient was later intubated in the ED with an 8.0 ETT and central line was placed. CT Brain no acute findings. EKG read STEMI. WBC 24K, Trop 0.684 then bumped to 6.088,   Cardiology felt not STEMI, heparin infusion for NSTEMI initiated    Hospital/ICU Course:  Arrived to ICU with OETT on mechanical vent; TTM initiated. Art line placed.on propofol and fentanyl sedation for vent control  2/20 intubated on [facility] vent with TTM at goal temp; RN reported early am withdrawal on rt and +pupillary light response; h/h decreased.  WBC down 20K    History reviewed. No pertinent past medical history.    History reviewed. No pertinent surgical history.    Review of patient's allergies indicates:   Allergen Reactions    Pcn [penicillins]        Family History     None        Tobacco Use    Smoking status: Unknown If Ever Smoked   Substance and Sexual Activity    Alcohol use: Not on file    Drug use: Not on file    Sexual activity: Not on file         Review of Systems   Reason unable to perform ROS: intubated and sedated.     Objective:     Vital Signs (Most Recent):  Temp: (!) 92.3 °F (33.5 °C) (02/20/19 1200)  Pulse: (!) 53 (02/20/19 1155)  Resp: (!) 22 (02/20/19 0445)  BP: (!) 79/40 (02/20/19 1000)  SpO2: 99  % (02/20/19 1155) Vital Signs (24h Range):  Temp:  [91.2 °F (32.9 °C)-98.5 °F (36.9 °C)] 92.3 °F (33.5 °C)  Pulse:  [] 53  Resp:  [8-54] 22  SpO2:  [85 %-100 %] 99 %  BP: ()/() 79/40     Weight: 96.9 kg (213 lb 11.2 oz)  Body mass index is 40.38 kg/m².      Intake/Output Summary (Last 24 hours) at 2/20/2019 1232  Last data filed at 2/20/2019 1200  Gross per 24 hour   Intake 4468.07 ml   Output 751 ml   Net 3717.07 ml       Physical Exam   Constitutional: She appears ill. She is sedated and intubated.   HENT:   Head: Normocephalic.   Eyes: Conjunctivae are normal. Right pupil is not reactive. Left pupil is not reactive.   Pinpoint, fixed bilaterally   Neck: Trachea normal. No JVD present. No tracheal deviation present.   Cardiovascular: Intact distal pulses. An irregular rhythm present. Bradycardia present.   Pulses:       Radial pulses are 2+ on the right side, and 2+ on the left side.   Pulmonary/Chest: She is intubated. She has decreased breath sounds in the right lower field and the left lower field. She has wheezes in the right upper field, the right middle field, the left upper field and the left middle field.   Intubated   Abdominal: Soft. Bowel sounds are normal. She exhibits no distension.   Musculoskeletal: She exhibits no edema.   Mild edema to LEs   Neurological: She is unresponsive. GCS eye subscore is 1. GCS verbal subscore is 1. GCS motor subscore is 1.   Skin: Skin is warm and dry. Capillary refill takes 2 to 3 seconds.   Heel protectors to bilateral feet.       Vents:  Vent Mode: A/C (02/20/19 1155)  Set Rate: 22 bmp (02/20/19 1155)  Vt Set: 400 mL (02/20/19 1155)  Pressure Support: 0 cmH20 (02/20/19 1155)  PEEP/CPAP: 5 cmH20 (02/20/19 1155)  Oxygen Concentration (%): 40 (02/20/19 1155)  Peak Airway Pressure: 14 cmH2O (02/20/19 1155)  Plateau Pressure: 0 cmH20 (02/20/19 1155)  Total Ve: 13.1 mL (02/20/19 1155)    Lines/Drains/Airways     Central Venous Catheter Line                  Percutaneous Central Line Insertion/Assessment - triple lumen  02/19/19 2150 right internal jugular less than 1 day          Drain                 NG/OG Tube 02/19/19 2017 orogastric 18 Fr. Left mouth less than 1 day         Urethral Catheter 02/19/19 2233 Temperature probe less than 1 day          Airway                 Airway - Non-Surgical 02/19/19 1943 Endotracheal Tube less than 1 day          Arterial Line                 Arterial Line 02/19/19 2310 Left Radial less than 1 day          Peripheral Intravenous Line                 Peripheral IV - Single Lumen 02/19/19 1935 Left Antecubital less than 1 day         Peripheral IV - Single Lumen 02/19/19 1951 Right Forearm less than 1 day                Significant Labs:    CBC/Anemia Profile:  Recent Labs   Lab 02/19/19 1937 02/20/19 0330 02/20/19  0615   WBC 24.28* 20.90*  --    HGB 8.2* 7.7* 7.4*   HCT 30.9* 26.6*  --    * 454*  --    MCV 81* 78*  --    RDW 19.3* 18.9*  --    IRON  --   --  20*   FERRITIN  --   --  42        Chemistries:  Recent Labs   Lab 02/19/19 1937 02/19/19 2317 02/20/19 0330 02/20/19  0804   * 136 136 135*   K 5.0 3.4* 3.0* 3.9    109 112* 110   CO2 13* 16* 16* 16*   BUN 15 17 18 18   CREATININE 1.0 0.9 0.8 0.8   CALCIUM 9.4 7.7* 8.1* 8.5*   ALBUMIN 2.9*  --   --   --    PROT 6.9  --   --   --    BILITOT 0.2  --   --   --    ALKPHOS 154*  --   --   --    ALT 17  --   --   --    AST 55*  --   --   --    MG  --  1.9 1.9 2.5   PHOS  --  3.4 2.3* 3.7       All pertinent labs within the past 24 hours have been reviewed.  ABG  Recent Labs   Lab 02/20/19  0430   PH 7.239*   PO2 172*   PCO2 38.2   HCO3 16.4*   BE -11       Significant Imaging:   I have reviewed all pertinent imaging results/findings within the past 24 hours.      ABG  Recent Labs   Lab 02/20/19  0430   PH 7.239*   PO2 172*   PCO2 38.2   HCO3 16.4*   BE -11     Assessment/Plan:     Neuro   Acute anoxic encephalopathy    Suspect herniation with reported change  from hypertension and normal HR to normotensive and bradycardia concurrent with loss of pupil reaction and motor response  POA now onsite and feels strongly that pt would not want to continue current care and would not have wanted resuscitation  He is requesting palliative withdrawal of all life sustaining measures     Pulmonary   On mechanically assisted ventilation    Vent settings reviewed and appropriate for optimal gas exchange  VAP prophylaxis  No SAT/SBT until post rewarming with signs of ability to protect airway     Cardiac/Vascular   * Cardiac arrest    Witnessed at home arrest, EKG suggested cardiac event/ischemia/injury  On heparin infusion per cardiology, would consider LHC post rewarming         Critical Care Daily Checklist:    A: Awake: RASS Goal/Actual Goal: RASS Goal: -4-->deep sedation  Actual: Nolasco Agitation Sedation Scale (RASS): Unarousable   B: Spontaneous Breathing Trial Performed?     C: SAT & SBT Coordinated?  n/a                      D: Delirium: CAM-ICU Overall CAM-ICU: Positive   E: Early Mobility Performed? ROM   F: Feeding Goal: Goals: Meet > 85 % EEN/EPN while admitted  Status: Nutrition Goal Status: new   Current Diet Order   Procedures    Diet NPO      AS: Analgesia/Sedation For vent control   T: Thromboembolic Prophylaxis heparin   H: HOB > 300 Yes   U: Stress Ulcer Prophylaxis (if needed) pepcid   G: Glucose Control monitoring   B: Bowel Function     I: Indwelling Catheter (Lines & Garcia) Necessity reviewed   D: De-escalation of Antimicrobials/Pharmacotherapies reviewed    Plan for the day/ETD Will palliative withdraw support after cessation of TTM if desired by POA    Code Status:  Family/Goals of Care: DNR  As above   I have discussed case and plan of care in detail with Dr Sunshine and Dr Whyte; Status and plan of care were discussed with team on multidisciplinary rounds.    Critical Care Time: 50 minutes  Critical secondary to encephalopathy, resp failure post  cardiopulmonary arrest; Critical care was time spent personally by me on the following activities: development of treatment plan with patient or surrogate and bedside caregivers, discussions with consultants, evaluation of patient's response to treatment, examination of patient, ordering and performing treatments and interventions, ordering and review of laboratory studies, ordering and review of radiographic studies, pulse oximetry, re-evaluation of patient's condition. This critical care time did not overlap with that of any other provider or involve time for any procedures.    Thank you for your consult.      Katerine Gauthier NP  Critical Care Medicine  [facility]

## 2019-02-20 NOTE — ASSESSMENT & PLAN NOTE
Witnessed at home arrest, EKG suggested cardiac event/ischemia/injury  On heparin infusion per cardiology, would consider LHC post rewarming

## 2019-02-20 NOTE — HPI
Ms. Degroot was brought to the ED status post cardiac arrest at home (witnessed by her brother).  Between fire department and EMS, patient received four rounds of epinephrine and three shocks along with amiodarone.  No family at the bedside to obtain any further information.  Patient was intubated in the field.  In the ED, CT head negative for ischemia.  Initial EKG revealed STEMI, reviewed by Dr. Em, no evidence of STEMI.  Laboratory workup reveals lactic acid of 10.8 initially, improved to 4.2.  WBC 63102, hemoglobin 8.2, platelets 520.  Troponin 0.684.  Cardiology recommended hypothermia protocol as patient meets criteria.  Patient is currently twitching with tactile stimulus.  No known history of seizures.  Dr. Olsen was able to get in touch with the patient's brother, who stated the patient is a full code at this time.  Central line placed and patient started on hypothermia protocol.

## 2019-02-20 NOTE — PROGRESS NOTES
[facility] Hospital Medicine  Progress Note    Patient Name: Elenita Degroot  MRN: [id number]  Patient Class: IP- Inpatient   Admission Date: 2/19/2019  Length of Stay: 1 days  Attending Physician: Warner Whyte MD  Primary Care Provider: Provider Notinsystem        Subjective:     Principal Problem:Cardiac arrest    HPI:  Ms. Degroot was brought to the ED status post cardiac arrest at home (witnessed by her brother).  Between fire department and EMS, patient received four rounds of epinephrine and three shocks along with amiodarone.  No family at the bedside to obtain any further information.  Patient was intubated in the field.  In the ED, CT head negative for ischemia.  Initial EKG revealed STEMI, reviewed by Dr. Em, no evidence of STEMI.  Laboratory workup reveals lactic acid of 10.8 initially, improved to 4.2.  WBC 82394, hemoglobin 8.2, platelets 520.  Troponin 0.684.  Cardiology recommended hypothermia protocol as patient meets criteria.  Patient is currently twitching with tactile stimulus.  No known history of seizures.  Dr. Olsen was able to get in touch with the patient's brother, who stated the patient is a full code at this time.  Central line placed and patient started on hypothermia protocol.    Hospital Course:  2/20  Patient s/p Cardiac Arrest with ROSC. Patient currently on Artic Sun cooling protocol. Tolerating therapy. Case discussed with CC Team.NSTEMI per Cardiology.    Interval History: Hypothermic protocol in progress. Discussed with CC Team    Review of Systems   Unable to perform ROS: Intubated     Objective:     Vital Signs (Most Recent):  Temp: (!) 92.3 °F (33.5 °C) (02/20/19 1200)  Pulse: (!) 53 (02/20/19 1155)  Resp: (!) 22 (02/20/19 0445)  BP: (!) 79/40 (02/20/19 1000)  SpO2: 99 % (02/20/19 1155) Vital Signs (24h Range):  Temp:  [91.2 °F (32.9 °C)-98.5 °F (36.9 °C)] 92.3 °F (33.5 °C)  Pulse:  [] 53  Resp:  [8-54] 22  SpO2:  [85 %-100 %] 99 %  BP:  ()/() 79/40     Weight: 96.9 kg (213 lb 11.2 oz)  Body mass index is 40.38 kg/m².    Intake/Output Summary (Last 24 hours) at 2/20/2019 1233  Last data filed at 2/20/2019 1200  Gross per 24 hour   Intake 4468.07 ml   Output 751 ml   Net 3717.07 ml      Physical Exam   Constitutional: She is oriented to person, place, and time. She appears well-developed and well-nourished. She appears ill. No distress. She is sedated and intubated.   HENT:   Head: Normocephalic and atraumatic.   Eyes: Pupils are equal, round, and reactive to light.   Sluggish   Neck: No JVD present. No tracheal deviation present.   Cardiovascular: Normal rate and intact distal pulses. An irregular rhythm present.   Murmur heard.   Systolic murmur is present with a grade of 3/6.  Pulmonary/Chest: Effort normal. She is intubated. No respiratory distress. She has no wheezes. She has rhonchi in the right middle field, the right lower field and the left middle field.   Abdominal: Soft. Bowel sounds are normal. She exhibits no distension.   Musculoskeletal: Normal range of motion. She exhibits no edema or tenderness.   Neurological: She is oriented to person, place, and time. She has normal reflexes. She is unresponsive. No cranial nerve deficit.   Skin: Skin is warm and dry. She is not diaphoretic. No erythema.   Psychiatric: She has a normal mood and affect. Her behavior is normal. Judgment and thought content normal.   Nursing note and vitals reviewed.      Significant Labs:   BMP:   Recent Labs   Lab 02/20/19  0804   *   *   K 3.9      CO2 16*   BUN 18   CREATININE 0.8   CALCIUM 8.5*   MG 2.5     CBC:   Recent Labs   Lab 02/19/19 1937 02/20/19  0330 02/20/19  0615   WBC 24.28* 20.90*  --    HGB 8.2* 7.7* 7.4*   HCT 30.9* 26.6*  --    * 454*  --      CMP:   Recent Labs   Lab 02/19/19 1937 02/19/19  2317 02/20/19  0330 02/20/19  0804   *  --  136 136 135*   K 5.0  --  3.4* 3.0* 3.9     --  109 112*  110   CO2 13*  --  16* 16* 16*   *   < > 344*  344*  344* 110 148*   BUN 15  --  17 18 18   CREATININE 1.0  --  0.9 0.8 0.8   CALCIUM 9.4  --  7.7* 8.1* 8.5*   PROT 6.9  --   --   --   --    ALBUMIN 2.9*  --   --   --   --    BILITOT 0.2  --   --   --   --    ALKPHOS 154*  --   --   --   --    AST 55*  --   --   --   --    ALT 17  --   --   --   --    ANIONGAP 17*  --  11 8 9   EGFRNONAA >60  --  >60 >60 >60    < > = values in this interval not displayed.     Lactic Acid:   Recent Labs   Lab 02/19/19 2203 02/19/19 2328 02/20/19  0330   LACTATE 4.2* 4.0* 1.9     Lipase: No results for input(s): LIPASE in the last 48 hours.  Troponin:   Recent Labs   Lab 02/19/19 1937 02/19/19  2317   TROPONINI 0.684* 6.088*     Urine Studies:   Recent Labs   Lab 02/19/19 1949   COLORU Yellow   APPEARANCEUA Clear   PHUR 6.0   SPECGRAV >=1.030*   PROTEINUA 2+*   GLUCUA Trace*   KETONESU Negative   BILIRUBINUA Negative   OCCULTUA 1+*   NITRITE Negative   UROBILINOGEN Negative   LEUKOCYTESUR Negative   RBCUA 3   WBCUA 0   BACTERIA Occasional   SQUAMEPITHEL 6   HYALINECASTS 1     All pertinent labs within the past 24 hours have been reviewed.    Significant Imaging: I have reviewed all pertinent imaging results/findings within the past 24 hours.    Assessment/Plan:      * Cardiac arrest    Estimated 25 min of down time before ROSC.  Currently having nonpurposeful twitching movements with tactile stimuli.  Started on hypothermia protocol.  Admit to ICU, Pulmonary Consult  High likelihood of anoxic brain injury.  Re-evaluate after rewarming completed.    2/20  Cards  [place] Sun  ASA  Heparin       Acute anoxic encephalopathy    High likelihood of anoxic brain injury given approximate down time of 25 min.  CT head negative for ischemia.  Re-evaluate neurological status after rewarming.    2/20  Monitor for Anoxic Injury  Await completion of Hypothermic Protocol  Neurology on Consult       Leucocytosis    No evidence of sepsis,  elevated WBC likely reactive the setting of cardiac arrest.  Chest x-ray clear of infiltrates, masses or effusions.  Patient started on IV vancomycin, Zosyn, levofloxacin empirically in the ED.   Monitor clinically for continued need for antibiotics.    2/20  Likely Reactive  Improving now 20.90 vs 24.28  Monitor     Elevated troponin    Troponin elevated 0.684, likely secondary to cardiac arrest.  Will trend cardiac enzymes.  Cardiology consulted    2/20  Last Trop 6.088     Lactic acidosis    Lactic acid 10.8, improved to 4.2.  No clear evidence of sepsis, elevated lactic acid likely secondary to hypoperfusion from sepsis cardiac arrest.         VTE Risk Mitigation (From admission, onward)        Ordered     heparin 25,000 units in dextrose 5% 250 mL (100 units/mL) infusion LOW INTENSITY nomogram - [facility]  Continuous      02/20/19 0006     heparin 25,000 units in dextrose 5% (100 units/ml) IV bolus from bag - ADDITIONAL PRN BOLUS - 60 units/kg (max bolus 4000 units)  As needed (PRN)      02/20/19 0006     heparin 25,000 units in dextrose 5% (100 units/ml) IV bolus from bag - ADDITIONAL PRN BOLUS - 30 units/kg (max bolus 4000 units)  As needed (PRN)      02/20/19 0006     IP VTE HIGH RISK PATIENT  Once      02/20/19 0049     Place sequential compression device  Until discontinued      02/20/19 0049          Critical care time spent on the evaluation and treatment of severe organ dysfunction, review of pertinent labs and imaging studies, discussions with consulting providers and discussions with patient/family: 35 minutes.    Warner Whyte MD  Department of Hospital Medicine   [facility]

## 2019-02-20 NOTE — ASSESSMENT & PLAN NOTE
Suspect herniation with reported change from hypertension and normal HR to normotensive and bradycardia concurrent with loss of pupil reaction and motor response  POA now onsite and feels strongly that pt would not want to continue current care and would not have wanted resuscitation  He is requesting palliative withdrawal of all life sustaining measures

## 2019-02-20 NOTE — SUBJECTIVE & OBJECTIVE
History reviewed. No pertinent past medical history.    History reviewed. No pertinent surgical history.    Review of patient's allergies indicates:  Not on File    No current facility-administered medications on file prior to encounter.      No current outpatient medications on file prior to encounter.     Family History     None        Tobacco Use    Smoking status: Unknown If Ever Smoked   Substance and Sexual Activity    Alcohol use: Not on file    Drug use: Not on file    Sexual activity: Not on file     Review of Systems   Unable to perform ROS: Patient unresponsive     Objective:     Vital Signs (Most Recent):  Temp: 98.5 °F (36.9 °C) (02/19/19 2000)  Pulse: 83 (02/19/19 2223)  Resp: (!) 35 (02/19/19 2147)  BP: (!) 180/73 (02/19/19 2147)  SpO2: 100 % (02/19/19 2223) Vital Signs (24h Range):  Temp:  [97.5 °F (36.4 °C)-98.5 °F (36.9 °C)] 98.5 °F (36.9 °C)  Pulse:  [] 83  Resp:  [23-54] 35  SpO2:  [97 %-100 %] 100 %  BP: (133-187)/() 180/73     Weight: 96.9 kg (213 lb 11.2 oz)  Body mass index is 40.38 kg/m².    Physical Exam   Constitutional: She is intubated.   Patient currently intubated, not on sedation.  No family at the bedside   HENT:   Head: Normocephalic and atraumatic.   Eyes: Conjunctivae are normal. No scleral icterus.   Pupils are very sluggishly reactive to light   Cardiovascular: Normal rate.   No murmur heard.  Pulmonary/Chest: She is intubated. She has rhonchi.   Abdominal: Soft. She exhibits no distension. No hernia.   Musculoskeletal: She exhibits edema (1+).   Lymphadenopathy:     She has no cervical adenopathy.   Neurological:   Unresponsive, GCS three.  Nonpurposeful twitching movements to tactile stimuli   Skin: She is not diaphoretic. No erythema.   Nursing note and vitals reviewed.          Significant Labs:   ABGs:   Recent Labs   Lab 02/19/19 1951   PH 7.055*   PCO2 58.6*   HCO3 16.4*   POCSATURATED 100   BE -14     BMP:   Recent Labs   Lab 02/19/19 1937  02/19/19 2203   * 307*   *  --    K 5.0  --      --    CO2 13*  --    BUN 15  --    CREATININE 1.0  --    CALCIUM 9.4  --      CBC:   Recent Labs   Lab 02/19/19 1937   WBC 24.28*   HGB 8.2*   HCT 30.9*   *     CMP:   Recent Labs   Lab 02/19/19 1937 02/19/19 2203   *  --    K 5.0  --      --    CO2 13*  --    * 307*   BUN 15  --    CREATININE 1.0  --    CALCIUM 9.4  --    PROT 6.9  --    ALBUMIN 2.9*  --    BILITOT 0.2  --    ALKPHOS 154*  --    AST 55*  --    ALT 17  --    ANIONGAP 17*  --    EGFRNONAA >60  --      Cardiac Markers:   Recent Labs   Lab 02/19/19 1937   BNP 19     Coagulation:   Recent Labs   Lab 02/19/19 1937   INR 1.0   APTT 21.0     Lactic Acid:   Recent Labs   Lab 02/19/19 1937 02/19/19 2203   LACTATE 10.8* 4.2*     Lipase: No results for input(s): LIPASE in the last 48 hours.  Troponin:   Recent Labs   Lab 02/19/19 1937   TROPONINI 0.684*     TSH: No results for input(s): TSH in the last 4320 hours.  Urine Studies:   Recent Labs   Lab 02/19/19 1949   COLORU Yellow   APPEARANCEUA Clear   PHUR 6.0   SPECGRAV >=1.030*   PROTEINUA 2+*   GLUCUA Trace*   KETONESU Negative   BILIRUBINUA Negative   OCCULTUA 1+*   NITRITE Negative   UROBILINOGEN Negative   LEUKOCYTESUR Negative   RBCUA 3   WBCUA 0   BACTERIA Occasional   SQUAMEPITHEL 6   HYALINECASTS 1     All pertinent labs within the past 24 hours have been reviewed.    Significant Imaging: I have reviewed and interpreted all pertinent imaging results/findings within the past 24 hours.     Imaging Results          X-Ray Chest AP Portable (Final result)  Result time 02/19/19 22:08:41    Final result by Nathaniel Harrison MD (02/19/19 22:08:41)                 Impression:      The endotracheal tube has been repositioned with its tip above the rula at T4 level.      Electronically signed by: Bud Harrison MD  Date:    02/19/2019  Time:    22:08             Narrative:    EXAMINATION:  XR CHEST AP  PORTABLE    CLINICAL HISTORY:  Encounter for adjustment and management of vascular access device    TECHNIQUE:  Single frontal view of the chest was performed.    COMPARISON:  Previous study of this date is reviewed.    FINDINGS:  The endotracheal tube is been drawn back and now lies with its tip above the rula at the level of T4.  A central line is positioned via right jugular approach with tip in superior vena cava.  The heart is normal in size.  Lungs appear free of any active infiltrate, effusion, or pneumothorax.                               CT Head Without Contrast (Final result)  Result time 02/19/19 20:48:14    Final result by Nathaniel Harrison MD (02/19/19 20:48:14)                 Impression:      CT scan of the brain demonstrates no acute hemorrhage or infarct.  Note is made of fluid layering dependently within sphenoid sinuses and left maxillary sinus.    All CT scans at (this location) are performed using dose modulation techniques as appropriate to a performed exam including the following:  automated exposure control; adjustment of the mA and /or kV according to patient size (this includes techniques or standardized protocols for targeted exams where dose is matched to indication/reason for exam: i.e. extremities or head); use of iterative reconstruction technique.      Electronically signed by: Bud Harrison MD  Date:    02/19/2019  Time:    20:48             Narrative:    EXAMINATION:  CT HEAD WITHOUT CONTRAST    CLINICAL HISTORY:  cardiac arrest;    TECHNIQUE:  Standard noncontrast CT of the brain.    All CT scans at this facility use dose modulation, iterative reconstruction and/or weight based dosing when appropriate to reduce radiation dose to as low as reasonably achievable.    COMPARISON:  None.    FINDINGS:  The ventricles are nonenlarged.  No acute hemorrhage edema or mass effect is identified.  No suspicious extra-axial fluid collections are demonstrated.  The ventricular system is  within normal limits.    This pattern fluid layer at benign left maxillary sinus and within the sphenoid sinuses bilaterally.  Patchy mucosal thickening in the ethmoid air cells is present.  The mastoids are clear.    The skull is grossly normal.                               X-Ray Chest AP Portable (Final result)  Result time 02/19/19 20:09:35    Final result by Nathaniel Harrison MD (02/19/19 20:09:35)                 Impression:      Endotracheal tube is positioned inferiorly extending into the right mainstem bronchus.    Five 0 this finding at 8:07 p.m..  I discussed these findings with the emergency room physician Dr. Olsen at 8:09 p.m..      Electronically signed by: Bud Harrison MD  Date:    02/19/2019  Time:    20:09             Narrative:    EXAMINATION:  XR CHEST AP PORTABLE    CLINICAL HISTORY:  Chest Pain;    TECHNIQUE:  Single frontal view of the chest was performed.    COMPARISON:  None    FINDINGS:  Endotracheal tube is present which is advanced too inferior in position extending into the right mainstem bronchus.  The heart is normal in size.  The lungs appear clear and well expanded.  EKG leads overlie the chest.                                I have independently reviewed and interpreted the EKG. My findings include    I have independently reviewed all pertinent labs within the past 24 hours.    I have independently reviewed, visualized and interpreted all pertinent imaging results within the past 24 hours and discussed the findings with the ED physician, Dr. Olsen.

## 2019-02-20 NOTE — HPI
Ms. Degroot is a 63 year old female patient with unknown past medical history who presented to [facility] ED yesterday s/p witnessed cardiac arrest. Patient was reportedly down for approximately 25-30 minutes. EMS was called and administered 4 round of epinephrine along with three shocks and a dosage of amiodarone. Initial workup in ED revealed lactic acidosis (10.8), leukocytosis (WBC 24,000), anemia (hemoglobin of 8.2), and initial troponin of 0.684. EKG showed non-specific ST-T changes but no STEMI, and patient was subsequently admitted to ICU on hypothermia protocol. Patient seen and examined today, remains intubated and sedated. On pressor support. No prior cardiac history. No family present at time of exam. Chart reviewed. Lactic acid has normalized. Troponin 0.684>6.088. H and H remains low. 2D echo reviewed and showed normal EF, RVE with mildly depressed systolic function, and pulmonary HTN.

## 2019-02-20 NOTE — PLAN OF CARE
Presently intubated     02/20/19 1205   Medicare Message   Important Message from Medicare regarding Discharge Appeal Rights Other (comments)   Date IMM was signed 02/20/19   Time IMM was signed 1205

## 2019-02-20 NOTE — EICU
New admit    64 y/o F brought in after a witnessed cardiac arrest about 45 minutes prior to arrival to the ED. On arrival of fire department patient had agonal breathing and CPR ws started receiving 4 epi, 300 mg amiodarone 2 mg naloxone via left tibial IO, and shocked 3 times with ROSC.  Intubated on the field with a kate tube changed in the ED. She is now on targeted temperature therapy. Sedated propofol and Fentanyl, and on heparin drip.    Data:  Na 136, K 3.0, creatinine 0.8, Mg 1.9 Phos 2.3  WBC 20.9, H/H 7.7/26.6, Plt 454  Troponin 6.088  Lactate 1.9 from 4  Tox screen positive for opiates  UA negative  CXR no acute infiltrate, tip of ET tube at the rula    · Called bedside nurse to have ET tube retracted by 2-3 cm, currently fixed at 24 on the lip  · Ordered 20 meqs K  · Ordered 15 mmol KPhos  · Ordered iron studies and type and screen and repeat H/H after 4 hours, transfuse as needed  · Will switch piperacillin tazobactam to cefepime due to reported penicillin allergy with unclear reaction. No clear source of infection, de-escalate based on culture results

## 2019-02-20 NOTE — ASSESSMENT & PLAN NOTE
-Secondary to demand ischemia from cardiac arrest, anemia, hypoxia  -Continue to trend  -Continue ASA, heparin, statin  -Add BB once off pressor support

## 2019-02-20 NOTE — SUBJECTIVE & OBJECTIVE
History reviewed. No pertinent past medical history.    History reviewed. No pertinent surgical history.    Review of patient's allergies indicates:   Allergen Reactions    Pcn [penicillins]        No current facility-administered medications on file prior to encounter.      No current outpatient medications on file prior to encounter.     Family History     None        Tobacco Use    Smoking status: Unknown If Ever Smoked   Substance and Sexual Activity    Alcohol use: Not on file    Drug use: Not on file    Sexual activity: Not on file     Review of Systems   Unable to perform ROS: intubated     Objective:     Vital Signs (Most Recent):  Temp: (!) 93.6 °F (34.2 °C) (02/20/19 1000)  Pulse: 60 (02/20/19 1015)  Resp: (!) 22 (02/20/19 0445)  BP: (!) 79/40 (02/20/19 1000)  SpO2: 97 % (02/20/19 1015) Vital Signs (24h Range):  Temp:  [91.2 °F (32.9 °C)-98.5 °F (36.9 °C)] 93.6 °F (34.2 °C)  Pulse:  [] 60  Resp:  [8-54] 22  SpO2:  [85 %-100 %] 97 %  BP: ()/() 79/40     Weight: 96.9 kg (213 lb 11.2 oz)  Body mass index is 40.38 kg/m².    SpO2: 97 %  O2 Device (Oxygen Therapy): ventilator      Intake/Output Summary (Last 24 hours) at 2/20/2019 1107  Last data filed at 2/20/2019 1000  Gross per 24 hour   Intake 4468.07 ml   Output 575 ml   Net 3893.07 ml       Lines/Drains/Airways     Central Venous Catheter Line                 Percutaneous Central Line Insertion/Assessment - triple lumen  02/19/19 2150 right internal jugular less than 1 day          Drain                 NG/OG Tube 02/19/19 2017 orogastric 18 Fr. Left mouth less than 1 day         Urethral Catheter 02/19/19 2233 Temperature probe less than 1 day          Airway                 Airway - Non-Surgical 02/19/19 1943 Endotracheal Tube less than 1 day          Arterial Line                 Arterial Line 02/19/19 2310 Left Radial less than 1 day          Peripheral Intravenous Line                 Peripheral IV - Single Lumen 02/19/19 1935  Left Antecubital less than 1 day         Peripheral IV - Single Lumen 02/19/19 1951 Right Forearm less than 1 day                Physical Exam   Constitutional:   Appears ill  Intubated sedated   Neck: Neck supple. No JVD present.   Cardiovascular: Normal rate, regular rhythm, S1 normal and S2 normal.   No murmur heard.  Pulmonary/Chest: Effort normal.   Coarse BS anteriorly   Abdominal: Soft.   Neurological:   Occasional myoclonic like jerking noted   Skin: Skin is warm and dry.   Nursing note and vitals reviewed.      Significant Labs:   CMP   Recent Labs   Lab 02/19/19 1937 02/19/19 2317 02/20/19 0330 02/20/19  0804   *  --  136 136 135*   K 5.0  --  3.4* 3.0* 3.9     --  109 112* 110   CO2 13*  --  16* 16* 16*   *   < > 344*  344*  344* 110 148*   BUN 15  --  17 18 18   CREATININE 1.0  --  0.9 0.8 0.8   CALCIUM 9.4  --  7.7* 8.1* 8.5*   PROT 6.9  --   --   --   --    ALBUMIN 2.9*  --   --   --   --    BILITOT 0.2  --   --   --   --    ALKPHOS 154*  --   --   --   --    AST 55*  --   --   --   --    ALT 17  --   --   --   --    ANIONGAP 17*  --  11 8 9   ESTGFRAFRICA >60  --  >60 >60 >60   EGFRNONAA >60  --  >60 >60 >60    < > = values in this interval not displayed.   , CBC   Recent Labs   Lab 02/19/19 1937 02/20/19 0330 02/20/19  0615   WBC 24.28* 20.90*  --    HGB 8.2* 7.7* 7.4*   HCT 30.9* 26.6*  --    * 454*  --    , Troponin   Recent Labs   Lab 02/19/19 1937 02/19/19 2317   TROPONINI 0.684* 6.088*    and All pertinent lab results from the last 24 hours have been reviewed.    Significant Imaging: Echocardiogram:   2D echo with color flow doppler:   Results for orders placed or performed during the hospital encounter of 02/19/19   2D echo with color flow doppler   Result Value Ref Range    QEF 55 55 - 65    Mitral Valve Regurgitation TRIVIAL     Diastolic Dysfunction No     Est. PA Systolic Pressure 41.83 (A)     Tricuspid Valve Regurgitation MODERATE (A)     and X-Ray:  CXR: X-Ray Chest 1 View (CXR):   Results for orders placed or performed during the hospital encounter of 02/19/19   X-Ray Chest 1 View    Narrative    EXAMINATION:  XR CHEST 1 VIEW    CLINICAL HISTORY:  intubated;    COMPARISON:  02/19/2019    FINDINGS:  The borders of the heart are not well seen.  There is a mild amount of haziness in the base of the left lung.  The right lung is clear.  There is no pneumothorax.  The costophrenic angles are sharp.  There has been interval advancement of the endotracheal tube.  The tip is at the rula.  A right internal jugular venous line remains in place.  An NG tube remains in place.  The distal portion of the NG tube is in a moderate size hiatal hernia with the tip above the right hemidiaphragm.      Impression    1. There has been interval advancement of the endotracheal tube. The tip is at the rula.  Hence, I recommend consideration of retraction of the endotracheal tube by 3 cm.  2.  An NG tube remains in place. The distal portion of the NG tube is in a moderate size hiatal hernia with the tip above the right hemidiaphragm.  3. The borders of the heart are not well seen. There is a mild amount of haziness in the base of the left lung.  This is characteristic of atelectasis or pneumonia.  .      Electronically signed by: Demian Mittal MD  Date:    02/20/2019  Time:    07:48    and X-Ray Chest PA and Lateral (CXR): No results found for this visit on 02/19/19.

## 2019-02-20 NOTE — ASSESSMENT & PLAN NOTE
No evidence of sepsis, elevated WBC likely reactive the setting of cardiac arrest.  Chest x-ray clear of infiltrates, masses or effusions.  Patient started on IV vancomycin, Zosyn, levofloxacin empirically in the ED.   Monitor clinically for continued need for antibiotics.    2/20  Likely Reactive  Improving now 20.90 vs 24.28  Monitor

## 2019-02-20 NOTE — CONSULTS
[facility]  Cardiology  Consult Note    Patient Name: Elenita Degroot  MRN: [id number]  Admission Date: 2/19/2019  Hospital Length of Stay: 1 days  Code Status: Full Code   Attending Provider: Warner Whyte MD   Consulting Provider: Mali Marie PA-C  Primary Care Physician: Provider [employer]  Principal Problem:Cardiac arrest    Patient information was obtained from patient, past medical records and ER records.     Inpatient consult to Cardiology  Consult performed by: Mali Marie PA-C  Consult ordered by: Randa Olsen MD        Subjective:     Chief Complaint:  Cardiac arrest     HPI:   Ms. Degroot is a 63 year old female patient with unknown past medical history who presented to [facility] ED yesterday s/p witnessed cardiac arrest. Patient was reportedly down for approximately 25-30 minutes. EMS was called and administered 4 round of epinephrine along with three shocks and a dosage of amiodarone. Initial workup in ED revealed lactic acidosis (10.8), leukocytosis (WBC 24,000), anemia (hemoglobin of 8.2), and initial troponin of 0.684. EKG showed non-specific ST-T changes but no STEMI, and patient was subsequently admitted to ICU on hypothermia protocol. Patient seen and examined today, remains intubated and sedated. On pressor support. No prior cardiac history. No family present at time of exam. Chart reviewed. Lactic acid has normalized. Troponin 0.684>6.088. H and H remains low. 2D echo reviewed and showed normal EF, RVE with mildly depressed systolic function, and pulmonary HTN.     History reviewed. No pertinent past medical history.    History reviewed. No pertinent surgical history.    Review of patient's allergies indicates:   Allergen Reactions    Pcn [penicillins]        No current facility-administered medications on file prior to encounter.      No current outpatient medications on file prior to encounter.     Family History     None        Tobacco Use    Smoking status:  Unknown If Ever Smoked   Substance and Sexual Activity    Alcohol use: Not on file    Drug use: Not on file    Sexual activity: Not on file     Review of Systems   Unable to perform ROS: intubated     Objective:     Vital Signs (Most Recent):  Temp: (!) 93.6 °F (34.2 °C) (02/20/19 1000)  Pulse: 60 (02/20/19 1015)  Resp: (!) 22 (02/20/19 0445)  BP: (!) 79/40 (02/20/19 1000)  SpO2: 97 % (02/20/19 1015) Vital Signs (24h Range):  Temp:  [91.2 °F (32.9 °C)-98.5 °F (36.9 °C)] 93.6 °F (34.2 °C)  Pulse:  [] 60  Resp:  [8-54] 22  SpO2:  [85 %-100 %] 97 %  BP: ()/() 79/40     Weight: 96.9 kg (213 lb 11.2 oz)  Body mass index is 40.38 kg/m².    SpO2: 97 %  O2 Device (Oxygen Therapy): ventilator      Intake/Output Summary (Last 24 hours) at 2/20/2019 1107  Last data filed at 2/20/2019 1000  Gross per 24 hour   Intake 4468.07 ml   Output 575 ml   Net 3893.07 ml       Lines/Drains/Airways     Central Venous Catheter Line                 Percutaneous Central Line Insertion/Assessment - triple lumen  02/19/19 2150 right internal jugular less than 1 day          Drain                 NG/OG Tube 02/19/19 2017 orogastric 18 Fr. Left mouth less than 1 day         Urethral Catheter 02/19/19 2233 Temperature probe less than 1 day          Airway                 Airway - Non-Surgical 02/19/19 1943 Endotracheal Tube less than 1 day          Arterial Line                 Arterial Line 02/19/19 2310 Left Radial less than 1 day          Peripheral Intravenous Line                 Peripheral IV - Single Lumen 02/19/19 1935 Left Antecubital less than 1 day         Peripheral IV - Single Lumen 02/19/19 1951 Right Forearm less than 1 day                Physical Exam   Constitutional:   Appears ill  Intubated sedated   Neck: Neck supple. No JVD present.   Cardiovascular: Normal rate, regular rhythm, S1 normal and S2 normal.   No murmur heard.  Pulmonary/Chest: Effort normal.   Coarse BS anteriorly   Abdominal: Soft.    Neurological:   Occasional myoclonic like jerking noted   Skin: Skin is warm and dry.   Nursing note and vitals reviewed.      Significant Labs:   CMP   Recent Labs   Lab 02/19/19 1937 02/19/19 2317 02/20/19 0330 02/20/19  0804   *  --  136 136 135*   K 5.0  --  3.4* 3.0* 3.9     --  109 112* 110   CO2 13*  --  16* 16* 16*   *   < > 344*  344*  344* 110 148*   BUN 15  --  17 18 18   CREATININE 1.0  --  0.9 0.8 0.8   CALCIUM 9.4  --  7.7* 8.1* 8.5*   PROT 6.9  --   --   --   --    ALBUMIN 2.9*  --   --   --   --    BILITOT 0.2  --   --   --   --    ALKPHOS 154*  --   --   --   --    AST 55*  --   --   --   --    ALT 17  --   --   --   --    ANIONGAP 17*  --  11 8 9   ESTGFRAFRICA >60  --  >60 >60 >60   EGFRNONAA >60  --  >60 >60 >60    < > = values in this interval not displayed.   , CBC   Recent Labs   Lab 02/19/19 1937 02/20/19 0330 02/20/19  0615   WBC 24.28* 20.90*  --    HGB 8.2* 7.7* 7.4*   HCT 30.9* 26.6*  --    * 454*  --    , Troponin   Recent Labs   Lab 02/19/19 1937 02/19/19 2317   TROPONINI 0.684* 6.088*    and All pertinent lab results from the last 24 hours have been reviewed.    Significant Imaging: Echocardiogram:   2D echo with color flow doppler:   Results for orders placed or performed during the hospital encounter of 02/19/19   2D echo with color flow doppler   Result Value Ref Range    QEF 55 55 - 65    Mitral Valve Regurgitation TRIVIAL     Diastolic Dysfunction No     Est. PA Systolic Pressure 41.83 (A)     Tricuspid Valve Regurgitation MODERATE (A)     and X-Ray: CXR: X-Ray Chest 1 View (CXR):   Results for orders placed or performed during the hospital encounter of 02/19/19   X-Ray Chest 1 View    Narrative    EXAMINATION:  XR CHEST 1 VIEW    CLINICAL HISTORY:  intubated;    COMPARISON:  02/19/2019    FINDINGS:  The borders of the heart are not well seen.  There is a mild amount of haziness in the base of the left lung.  The right lung is clear.  There is  no pneumothorax.  The costophrenic angles are sharp.  There has been interval advancement of the endotracheal tube.  The tip is at the rula.  A right internal jugular venous line remains in place.  An NG tube remains in place.  The distal portion of the NG tube is in a moderate size hiatal hernia with the tip above the right hemidiaphragm.      Impression    1. There has been interval advancement of the endotracheal tube. The tip is at the rula.  Hence, I recommend consideration of retraction of the endotracheal tube by 3 cm.  2.  An NG tube remains in place. The distal portion of the NG tube is in a moderate size hiatal hernia with the tip above the right hemidiaphragm.  3. The borders of the heart are not well seen. There is a mild amount of haziness in the base of the left lung.  This is characteristic of atelectasis or pneumonia.  .      Electronically signed by: Demian Mittal MD  Date:    02/20/2019  Time:    07:48    and X-Ray Chest PA and Lateral (CXR): No results found for this visit on 02/19/19.    Assessment and Plan:   Patient who presents with cardiac arrest. Continue supportive care and hypothermia protocol. Further workup pending clinical course.     * Cardiac arrest    -Unclear etiology  -No apparent cardiac history  -No family present  -Troponin 0.684>6.088  -Elevation secondary to demand ischemia; EKG shows non-specific ST-T changes, no STEMI  -Continue ASA  -Continue heparin gtt for 48 hours  -Will not add Plavix at present time given anemia/need for transfusion  -Add BB once off pressor support  -Add atorvastatin 40 mg nightly  -2D echo showed normal EF, RVE with mildly depressed RV systolic function  -On hypothermia protocol; further workup pending clinical course     Acute anoxic encephalopathy    -Mgmt as per critical care  -On hypothermia protocol       Elevated troponin    -Secondary to demand ischemia from cardiac arrest, anemia, hypoxia  -Continue to trend  -Continue ASA, heparin,  statin  -Add BB once off pressor support         VTE Risk Mitigation (From admission, onward)        Ordered     heparin 25,000 units in dextrose 5% 250 mL (100 units/mL) infusion LOW INTENSITY nomogram - [facility]  Continuous      02/20/19 0006     heparin 25,000 units in dextrose 5% (100 units/ml) IV bolus from bag - ADDITIONAL PRN BOLUS - 60 units/kg (max bolus 4000 units)  As needed (PRN)      02/20/19 0006     heparin 25,000 units in dextrose 5% (100 units/ml) IV bolus from bag - ADDITIONAL PRN BOLUS - 30 units/kg (max bolus 4000 units)  As needed (PRN)      02/20/19 0006     IP VTE HIGH RISK PATIENT  Once      02/20/19 0049     Place sequential compression device  Until discontinued      02/20/19 0049          Thank you for your consult. I will follow-up with patient. Please contact us if you have any additional questions.    Mali Marie PA-C  Cardiology   [facility]     Chart reviewed. Dr. Patiño examined patient and agrees with plan as outlined above.

## 2019-02-20 NOTE — ASSESSMENT & PLAN NOTE
High likelihood of anoxic brain injury given approximate down time of 25 min.  CT head negative for ischemia.  Re-evaluate neurological status after rewarming.    2/20  Monitor for Anoxic Injury  Await completion of Hypothermic Protocol  Neurology on Consult

## 2019-02-20 NOTE — ASSESSMENT & PLAN NOTE
Estimated 25 min of down time before ROSC.  Currently having nonpurposeful twitching movements with tactile stimuli.  Started on hypothermia protocol.  Admit to ICU, Pulmonary Consult  High likelihood of anoxic brain injury.  Re-evaluate after rewarming completed.

## 2019-02-20 NOTE — CONSULTS
Elenita Degroot [id number] is a 63 y.o. female who has been consulted for vancomycin dosing.  Dx: sepsis / goal trough 15-20  The patient has the following labs:     Date Creatinine (mg/dl)    BUN WBC Count   2/20/2019 Estimated Creatinine Clearance: 69 mL/min (based on SCr of 0.9 mg/dL). Lab Results   Component Value Date    BUN 17 02/19/2019     Lab Results   Component Value Date    WBC 24.28 (H) 02/19/2019      which calculates to an Estimated Creatinine Clearance: 69 mL/min (based on SCr of 0.9 mg/dL)..       Current weight is 96.9 kg (213 lb 11.2 oz)  Initial load = 1500mg  The patient will be started on vancomycin at a dose of 1000 mg every 12 hours. Patient will be followed by pharmacy for changes in renal function, toxicity, and efficacy.  The vancomycin trough has been ordered for 2/21 at 09:15.      Thank you for allowing us to participate in this patient's care.     Zane Travis

## 2019-02-20 NOTE — ASSESSMENT & PLAN NOTE
Estimated 25 min of down time before ROSC.  Currently having nonpurposeful twitching movements with tactile stimuli.  Started on hypothermia protocol.  Admit to ICU, Pulmonary Consult  High likelihood of anoxic brain injury.  Re-evaluate after rewarming completed.    2/20  Cards  Violet Fry  ASA  Heparin

## 2019-02-20 NOTE — H&P
[facility] Hospital Medicine  History & Physical    Patient Name: Elenita Degroot  MRN: [id number]  Admission Date: 2/19/2019  Attending Physician: Tucker Simms MD  Primary Care Provider: Provider Not[employer]ystem         Patient information was obtained from past medical records and ER records.     Subjective:     Principal Problem:Cardiac arrest    Chief Complaint:   Chief Complaint   Patient presents with    Cardiac Arrest     post cardiac arrest. witness arrest, agonal breathing on fire department arrival. ROSC. Intubated        HPI: Ms. Degroot was brought to the ED status post cardiac arrest at home (witnessed by her brother).  Between fire department and EMS, patient received four rounds of epinephrine and three shocks along with amiodarone.  No family at the bedside to obtain any further information.  Patient was intubated in the field.  In the ED, CT head negative for ischemia.  Initial EKG revealed STEMI, reviewed by Dr. Em, no evidence of STEMI.  Laboratory workup reveals lactic acid of 10.8 initially, improved to 4.2.  WBC 79842, hemoglobin 8.2, platelets 520.  Troponin 0.684.  Cardiology recommended hypothermia protocol as patient meets criteria.  Patient is currently twitching with tactile stimulus.  No known history of seizures.  Dr. Olsen was able to get in touch with the patient's brother, who stated the patient is a full code at this time.  Central line placed and patient started on hypothermia protocol.    History reviewed. No pertinent past medical history.    History reviewed. No pertinent surgical history.    Review of patient's allergies indicates:  Not on File    No current facility-administered medications on file prior to encounter.      No current outpatient medications on file prior to encounter.     Family History     Unable to obtain as patient is currently intubated, no family at the bedside        Tobacco Use    Smoking status: Unknown If Ever Smoked   Substance and Sexual  Activity    Alcohol use: Not on file    Drug use: Not on file    Sexual activity: Not on file     Review of Systems   Unable to perform ROS: Patient unresponsive     Objective:     Vital Signs (Most Recent):  Temp: 98.5 °F (36.9 °C) (02/19/19 2000)  Pulse: 83 (02/19/19 2223)  Resp: (!) 35 (02/19/19 2147)  BP: (!) 180/73 (02/19/19 2147)  SpO2: 100 % (02/19/19 2223) Vital Signs (24h Range):  Temp:  [97.5 °F (36.4 °C)-98.5 °F (36.9 °C)] 98.5 °F (36.9 °C)  Pulse:  [] 83  Resp:  [23-54] 35  SpO2:  [97 %-100 %] 100 %  BP: (133-187)/() 180/73     Weight: 96.9 kg (213 lb 11.2 oz)  Body mass index is 40.38 kg/m².    Physical Exam   Constitutional: She is intubated.   Patient currently intubated, not on sedation.  No family at the bedside   HENT:   Head: Normocephalic and atraumatic.   Eyes: Conjunctivae are normal. No scleral icterus.   Pupils are very sluggishly reactive to light   Cardiovascular: Normal rate.   No murmur heard.  Pulmonary/Chest: She is intubated. She has rhonchi.   Abdominal: Soft. She exhibits no distension. No hernia.   Musculoskeletal: She exhibits edema (1+).   Lymphadenopathy:     She has no cervical adenopathy.   Neurological:   Unresponsive, GCS three.  Nonpurposeful twitching movements to tactile stimuli   Skin: She is not diaphoretic. No erythema.   Nursing note and vitals reviewed.          Significant Labs:   ABGs:   Recent Labs   Lab 02/19/19 1951   PH 7.055*   PCO2 58.6*   HCO3 16.4*   POCSATURATED 100   BE -14     BMP:   Recent Labs   Lab 02/19/19 1937 02/19/19 2203   * 307*   *  --    K 5.0  --      --    CO2 13*  --    BUN 15  --    CREATININE 1.0  --    CALCIUM 9.4  --      CBC:   Recent Labs   Lab 02/19/19 1937   WBC 24.28*   HGB 8.2*   HCT 30.9*   *     CMP:   Recent Labs   Lab 02/19/19 1937 02/19/19 2203   *  --    K 5.0  --      --    CO2 13*  --    * 307*   BUN 15  --    CREATININE 1.0  --    CALCIUM 9.4  --    PROT 6.9   --    ALBUMIN 2.9*  --    BILITOT 0.2  --    ALKPHOS 154*  --    AST 55*  --    ALT 17  --    ANIONGAP 17*  --    EGFRNONAA >60  --      Cardiac Markers:   Recent Labs   Lab 02/19/19 1937   BNP 19     Coagulation:   Recent Labs   Lab 02/19/19 1937   INR 1.0   APTT 21.0     Lactic Acid:   Recent Labs   Lab 02/19/19 1937 02/19/19  2203   LACTATE 10.8* 4.2*     Lipase: No results for input(s): LIPASE in the last 48 hours.  Troponin:   Recent Labs   Lab 02/19/19 1937   TROPONINI 0.684*     TSH: No results for input(s): TSH in the last 4320 hours.  Urine Studies:   Recent Labs   Lab 02/19/19 1949   COLORU Yellow   APPEARANCEUA Clear   PHUR 6.0   SPECGRAV >=1.030*   PROTEINUA 2+*   GLUCUA Trace*   KETONESU Negative   BILIRUBINUA Negative   OCCULTUA 1+*   NITRITE Negative   UROBILINOGEN Negative   LEUKOCYTESUR Negative   RBCUA 3   WBCUA 0   BACTERIA Occasional   SQUAMEPITHEL 6   HYALINECASTS 1     All pertinent labs within the past 24 hours have been reviewed.    Significant Imaging: I have reviewed and interpreted all pertinent imaging results/findings within the past 24 hours.     Imaging Results          X-Ray Chest AP Portable (Final result)  Result time 02/19/19 22:08:41    Final result by Nathaniel Harrison MD (02/19/19 22:08:41)                 Impression:      The endotracheal tube has been repositioned with its tip above the rula at T4 level.      Electronically signed by: Bud Harrison MD  Date:    02/19/2019  Time:    22:08             Narrative:    EXAMINATION:  XR CHEST AP PORTABLE    CLINICAL HISTORY:  Encounter for adjustment and management of vascular access device    TECHNIQUE:  Single frontal view of the chest was performed.    COMPARISON:  Previous study of this date is reviewed.    FINDINGS:  The endotracheal tube is been drawn back and now lies with its tip above the rula at the level of T4.  A central line is positioned via right jugular approach with tip in superior vena cava.  The heart  is normal in size.  Lungs appear free of any active infiltrate, effusion, or pneumothorax.                               CT Head Without Contrast (Final result)  Result time 02/19/19 20:48:14    Final result by Nathaniel Harrison MD (02/19/19 20:48:14)                 Impression:      CT scan of the brain demonstrates no acute hemorrhage or infarct.  Note is made of fluid layering dependently within sphenoid sinuses and left maxillary sinus.    All CT scans at (this location) are performed using dose modulation techniques as appropriate to a performed exam including the following:  automated exposure control; adjustment of the mA and /or kV according to patient size (this includes techniques or standardized protocols for targeted exams where dose is matched to indication/reason for exam: i.e. extremities or head); use of iterative reconstruction technique.      Electronically signed by: Bud Harrison MD  Date:    02/19/2019  Time:    20:48             Narrative:    EXAMINATION:  CT HEAD WITHOUT CONTRAST    CLINICAL HISTORY:  cardiac arrest;    TECHNIQUE:  Standard noncontrast CT of the brain.    All CT scans at this facility use dose modulation, iterative reconstruction and/or weight based dosing when appropriate to reduce radiation dose to as low as reasonably achievable.    COMPARISON:  None.    FINDINGS:  The ventricles are nonenlarged.  No acute hemorrhage edema or mass effect is identified.  No suspicious extra-axial fluid collections are demonstrated.  The ventricular system is within normal limits.    This pattern fluid layer at benign left maxillary sinus and within the sphenoid sinuses bilaterally.  Patchy mucosal thickening in the ethmoid air cells is present.  The mastoids are clear.    The skull is grossly normal.                               X-Ray Chest AP Portable (Final result)  Result time 02/19/19 20:09:35    Final result by Nathaniel Harrison MD (02/19/19 20:09:35)                 Impression:       Endotracheal tube is positioned inferiorly extending into the right mainstem bronchus.    Five 0 this finding at 8:07 p.m..  I discussed these findings with the emergency room physician Dr. Olsen at 8:09 p.m..      Electronically signed by: Bud Harrison MD  Date:    02/19/2019  Time:    20:09             Narrative:    EXAMINATION:  XR CHEST AP PORTABLE    CLINICAL HISTORY:  Chest Pain;    TECHNIQUE:  Single frontal view of the chest was performed.    COMPARISON:  None    FINDINGS:  Endotracheal tube is present which is advanced too inferior in position extending into the right mainstem bronchus.  The heart is normal in size.  The lungs appear clear and well expanded.  EKG leads overlie the chest.                                I have independently reviewed and interpreted the EKG. My findings include    I have independently reviewed all pertinent labs within the past 24 hours.    I have independently reviewed, visualized and interpreted all pertinent imaging results within the past 24 hours and discussed the findings with the ED physician, Dr. Olsen.            Assessment/Plan:     * Cardiac arrest    Estimated 25 min of down time before ROSC.  Currently having nonpurposeful twitching movements with tactile stimuli.  Started on hypothermia protocol.  Admit to ICU, Pulmonary Consult  High likelihood of anoxic brain injury.  Re-evaluate after rewarming completed.       Acute anoxic encephalopathy    High likelihood of anoxic brain injury given approximate down time of 25 min.  CT head negative for ischemia.  Re-evaluate neurological status after rewarming.       Lactic acidosis    Lactic acid 10.8, improved to 4.2.  No clear evidence of sepsis, elevated lactic acid likely secondary to hypoperfusion from sepsis cardiac arrest.       Leucocytosis    No evidence of sepsis, elevated WBC likely reactive the setting of cardiac arrest.  Chest x-ray clear of infiltrates, masses or effusions.  Patient started on IV  vancomycin, Zosyn, levofloxacin empirically in the ED.   Monitor clinically for continued need for antibiotics.     Elevated troponin    Troponin elevated 0.684, likely secondary to cardiac arrest.  Will trend cardiac enzymes.  Cardiology consulted         VTE Risk Mitigation (From admission, onward)        Ordered     Place sequential compression device  Until discontinued      02/19/19 2202     IP VTE HIGH RISK PATIENT  Once      02/19/19 2110     Place sequential compression device  Until discontinued      02/19/19 2110             Tucker Simms MD  Department of Hospital Medicine   [facility]

## 2019-02-20 NOTE — SUBJECTIVE & OBJECTIVE
History reviewed. No pertinent past medical history.    History reviewed. No pertinent surgical history.    Review of patient's allergies indicates:   Allergen Reactions    Pcn [penicillins]        Family History     None        Tobacco Use    Smoking status: Unknown If Ever Smoked   Substance and Sexual Activity    Alcohol use: Not on file    Drug use: Not on file    Sexual activity: Not on file         Review of Systems   Reason unable to perform ROS: intubated and sedated.     Objective:     Vital Signs (Most Recent):  Temp: (!) 92.3 °F (33.5 °C) (02/20/19 1200)  Pulse: (!) 53 (02/20/19 1155)  Resp: (!) 22 (02/20/19 0445)  BP: (!) 79/40 (02/20/19 1000)  SpO2: 99 % (02/20/19 1155) Vital Signs (24h Range):  Temp:  [91.2 °F (32.9 °C)-98.5 °F (36.9 °C)] 92.3 °F (33.5 °C)  Pulse:  [] 53  Resp:  [8-54] 22  SpO2:  [85 %-100 %] 99 %  BP: ()/() 79/40     Weight: 96.9 kg (213 lb 11.2 oz)  Body mass index is 40.38 kg/m².      Intake/Output Summary (Last 24 hours) at 2/20/2019 1232  Last data filed at 2/20/2019 1200  Gross per 24 hour   Intake 4468.07 ml   Output 751 ml   Net 3717.07 ml       Physical Exam   Constitutional: She appears ill. She is sedated and intubated.   HENT:   Head: Normocephalic.   Eyes: Conjunctivae are normal. Right pupil is not reactive. Left pupil is not reactive.   Pinpoint, fixed bilaterally   Neck: Trachea normal. No JVD present. No tracheal deviation present.   Cardiovascular: Intact distal pulses. An irregular rhythm present. Bradycardia present.   Pulses:       Radial pulses are 2+ on the right side, and 2+ on the left side.   Pulmonary/Chest: She is intubated. She has decreased breath sounds in the right lower field and the left lower field. She has wheezes in the right upper field, the right middle field, the left upper field and the left middle field.   Intubated   Abdominal: Soft. Bowel sounds are normal. She exhibits no distension.   Musculoskeletal: She exhibits no  edema.   Mild edema to LEs   Neurological: She is unresponsive. GCS eye subscore is 1. GCS verbal subscore is 1. GCS motor subscore is 1.   Skin: Skin is warm and dry. Capillary refill takes 2 to 3 seconds.   Heel protectors to bilateral feet.       Vents:  Vent Mode: A/C (02/20/19 1155)  Set Rate: 22 bmp (02/20/19 1155)  Vt Set: 400 mL (02/20/19 1155)  Pressure Support: 0 cmH20 (02/20/19 1155)  PEEP/CPAP: 5 cmH20 (02/20/19 1155)  Oxygen Concentration (%): 40 (02/20/19 1155)  Peak Airway Pressure: 14 cmH2O (02/20/19 1155)  Plateau Pressure: 0 cmH20 (02/20/19 1155)  Total Ve: 13.1 mL (02/20/19 1155)    Lines/Drains/Airways     Central Venous Catheter Line                 Percutaneous Central Line Insertion/Assessment - triple lumen  02/19/19 2150 right internal jugular less than 1 day          Drain                 NG/OG Tube 02/19/19 2017 orogastric 18 Fr. Left mouth less than 1 day         Urethral Catheter 02/19/19 2233 Temperature probe less than 1 day          Airway                 Airway - Non-Surgical 02/19/19 1943 Endotracheal Tube less than 1 day          Arterial Line                 Arterial Line 02/19/19 2310 Left Radial less than 1 day          Peripheral Intravenous Line                 Peripheral IV - Single Lumen 02/19/19 1935 Left Antecubital less than 1 day         Peripheral IV - Single Lumen 02/19/19 1951 Right Forearm less than 1 day                Significant Labs:    CBC/Anemia Profile:  Recent Labs   Lab 02/19/19 1937 02/20/19 0330 02/20/19  0615   WBC 24.28* 20.90*  --    HGB 8.2* 7.7* 7.4*   HCT 30.9* 26.6*  --    * 454*  --    MCV 81* 78*  --    RDW 19.3* 18.9*  --    IRON  --   --  20*   FERRITIN  --   --  42        Chemistries:  Recent Labs   Lab 02/19/19 1937 02/19/19 2317 02/20/19  0330 02/20/19  0804   * 136 136 135*   K 5.0 3.4* 3.0* 3.9    109 112* 110   CO2 13* 16* 16* 16*   BUN 15 17 18 18   CREATININE 1.0 0.9 0.8 0.8   CALCIUM 9.4 7.7* 8.1* 8.5*   ALBUMIN  2.9*  --   --   --    PROT 6.9  --   --   --    BILITOT 0.2  --   --   --    ALKPHOS 154*  --   --   --    ALT 17  --   --   --    AST 55*  --   --   --    MG  --  1.9 1.9 2.5   PHOS  --  3.4 2.3* 3.7       All pertinent labs within the past 24 hours have been reviewed.  [facility]  Recent Labs   Lab 02/20/19  0430   PH 7.239*   PO2 172*   PCO2 38.2   HCO3 16.4*   BE -11       Significant Imaging:   I have reviewed all pertinent imaging results/findings within the past 24 hours.

## 2019-02-20 NOTE — ASSESSMENT & PLAN NOTE
Troponin elevated 0.684, likely secondary to cardiac arrest.  Will trend cardiac enzymes.  Cardiology consulted

## 2019-02-20 NOTE — HOSPITAL COURSE
2/20  Patient s/p Cardiac Arrest with ROSC. Patient currently on [employer] cooling protocol. Tolerating therapy. Case discussed with CC Team.NSTEMI per Cardiology.  2/21:Pt made DNR and extubated pt. Now comfort care only. Seen and examined. Deemed stable to be d/c to be transferred [facility].

## 2019-02-20 NOTE — PLAN OF CARE
Problem: Adult Inpatient Plan of Care  Goal: Plan of Care Review  Outcome: Ongoing (interventions implemented as appropriate)  Recommendations     Recommendation/Intervention: 1. When medically able, initiate TF of Nutren 1.5 @ 40 ml/hr  + 1 pack of beneprotein TID w/ flushes as needed per MD/NP.  With current propofol infusion, provides 1974 kcal, 83 g pro and 733 ml free water. 2. Check residuals Q4 hours. Hold if > 300 cc. 3. Will continue to monitor.   Intervention: Coordination of care w/ NP  Goals: Meet > 85 % EEN/EPN while admitted  Nutrition Goal Status: new  Communication of RD Recs: (POc, sticky note)

## 2019-02-20 NOTE — ASSESSMENT & PLAN NOTE
Vent settings reviewed and appropriate for optimal gas exchange  VAP prophylaxis  No SAT/SBT until post rewarming with signs of ability to protect airway

## 2019-02-20 NOTE — ASSESSMENT & PLAN NOTE
Lactic acid 10.8, improved to 4.2.  No clear evidence of sepsis, elevated lactic acid likely secondary to hypoperfusion from sepsis cardiac arrest.

## 2019-02-20 NOTE — ASSESSMENT & PLAN NOTE
High likelihood of anoxic brain injury given approximate down time of 25 min.  CT head negative for ischemia.  Re-evaluate neurological status after rewarming.

## 2019-02-20 NOTE — ASSESSMENT & PLAN NOTE
No evidence of sepsis, elevated WBC likely reactive the setting of cardiac arrest.  Chest x-ray clear of infiltrates, masses or effusions.  Patient started on IV vancomycin, Zosyn, levofloxacin empirically in the ED.   Monitor clinically for continued need for antibiotics.

## 2019-02-20 NOTE — PLAN OF CARE
Patient is presently intubated. No family at the bedside. CM unable to obtain info for d/c planning. CM to f/u for safe transition     02/20/19 1152   Discharge Assessment   Assessment Type Discharge Planning Assessment   Confirmed/corrected address and phone number on facesheet? No   Assessment information obtained from? Medical Record   Expected Length of Stay (days) (TBD)   Communicated expected length of stay with patient/caregiver no   Prior to hospitilization cognitive status: Unable to Assess;Coma/Sedated/Intubated   Current cognitive status: Coma/Sedated/Intubated;Unable to Assess   Lives With other (see comments)   Able to Return to Prior Arrangements other (see comments)   Is patient able to care for self after discharge? Unable to determine at this time (comments)   Patient's perception of discharge disposition other (comments)   Readmission Within the Last 30 Days no previous admission in last 30 days   Patient currently being followed by outpatient case management? Unable to determine (comments)   Equipment Currently Used at Home walker, standard   Do you have any problems affording any of your prescribed medications? TBD   Discharge Plan A Other   Discharge Plan B Other   DME Needed Upon Discharge  other (see comments)   Patient/Family in Agreement with Plan unable to assess

## 2019-02-20 NOTE — ED NOTES
Patient witness cardiac arrest by brother. Pt noted with agonal breathing on fire department arrival, CPR started and shocked x 2. CPR continued with arrival [facility] and shocked x 1. Given Epi x 4, Amiodarone 300 mg Narcan 2 mg per L tibia IO. ROSC PTA with Lucho tube in place.

## 2019-02-20 NOTE — ED PROVIDER NOTES
SCRIBE #1 NOTE: I, Drake Neri, am scribing for, and in the presence of, Randa Olsen MD. I have scribed the entire note.      History      Chief Complaint   Patient presents with    Cardiac Arrest     post cardiac arrest. witness arrest, agonal breathing on fire department arrival. ROSC. Intubated       Review of patient's allergies indicates:  Not on File     HPI   HPI    2/19/2019, 7:16 PM   History obtained from the [facility] [place] [facility]  HPI limited. Pt is unresponsive.       History of Present Illness: Elenita Degroot is a 63 y.o. female patient who presents to the Emergency Department for witnessed cardiac arrest which onset 45 minutes pta[facility] upon [facility] department arrival. Fire department started CPR and shocked her 2x.  [facility] shocked another 1x with ROSC. She is intubated with a kate tube currently. [facility] administered 4 epi, 300 mg amiodarone, and 2 mg narcan per left tibia IO.        Arrival mode: [facility]    PCP: [facility]       Past Medical History:  History reviewed. No pertinent medical history.       Past Surgical History:  History reviewed. No pertinent surgical history.     Family History:  History reviewed. No pertinent family history.     Social History:  Social History     Tobacco Use    Smoking status: Unknown   Substance and Sexual Activity    Alcohol use: `Unknown    Drug use: Unknown    Sexual activity: Unknown       ROS   Review of Systems   Unable to perform ROS: Patient unresponsive       Physical Exam      Initial Vitals   BP Pulse Resp Temp SpO2   02/19/19 1922 02/19/19 1918 02/19/19 1918 02/19/19 1922 02/19/19 1918   (!) 187/119 (!) 124 (!) 42 97.5 °F (36.4 °C) 97 %      MAP       --                 Physical Exam  Nursing Notes and Vital Signs Reviewed.  Physical exam is limited due to the patient's condition.   General: Patient is unresponsive to verbal and painful stimuli. Obese. Disheveled and unkept  Head: Atraumatic. Normocephalic.     Eyes: Pupils 2 mm, equal, non-reactive.    ENT: Airway patent. Lucho tube is in place. Frothy sputum noted. Mucus noted from b/l nares. Adentulous.    Cardiovascular: Regular rate and rhythm, no murmurs.   Respiratory:  Coarse breath sounds with controlled ventilation b/l.  No wheezing or crackles.   Abdominal: No distension   Musculoskeletal: No deformities, no edema,    Skin: Pale   Neurological: Gag reflex noted when re-intubating and exchanging ETT. Full neuro exam limited due to patient's condition, appears to have jerking noted of upper and lower extremities with painful stimulation. No purposeful movements noted.         ED Course    Intubation  Date/Time: 2/19/2019 7:42 PM  Performed by: Randa Olsen MD  Authorized by: Randa Olsen MD   Consent Done: Emergent Situation  Indications: respiratory failure  Intubation method: direct  Patient status: sedated  Preoxygenation: Lucho tube and BVM.  Sedatives: etomidate  Paralytic: succinylcholine  Laryngoscope size: Mac 4  Tube size: 8.0 mm  Number of attempts: 1  ETT to teeth: 22 cm  Chest x-ray interpreted by radiologist.  Chest x-ray findings: endotracheal tube too low  Tube repositioned: tube repositioned successfully  Patient tolerance: Patient tolerated the procedure well with no immediate complications  Complications: No  Specimens: No  Implants: No    Critical Care  Date/Time: 2/19/2019 9:00 PM  Performed by: Randa Olsen MD  Authorized by: Randa Olsen MD   Direct patient critical care time: 30 minutes  Additional history critical care time: 6 minutes  Ordering / reviewing critical care time: 10 minutes  Documentation critical care time: 10 minutes  Consulting other physicians critical care time: 4 minutes  Total critical care time (exclusive of procedural time) : 60 minutes  Critical care time was exclusive of separately billable procedures and treating other patients and teaching time.  Critical care was necessary to  "treat or prevent imminent or life-threatening deterioration of the following conditions: Cardiac arrest/respiratory distress.  Critical care was time spent personally by me on the following activities: blood draw for specimens, discussions with consultants, interpretation of cardiac output measurements, evaluation of patient's response to treatment, examination of patient, obtaining history from patient or surrogate, ordering and performing treatments and interventions, ordering and review of laboratory studies, ordering and review of radiographic studies, pulse oximetry and re-evaluation of patient's condition.    Central Line  Date/Time: 2/19/2019 9:26 PM  Performed by: Randa Olsen MD  Consent Done: Emergent Situation  Time out: Immediately prior to procedure a "time out" was called to verify the correct patient, procedure, equipment, support staff and site/side marked as required.  Indications: vascular access, hemodynamic monitoring and med administration  Preparation: skin prepped with ChloraPrep  Skin prep agent dried: skin prep agent completely dried prior to procedure  Sterile barriers: all five maximum sterile barriers used - cap, mask, sterile gown, sterile gloves, and large sterile sheet  Hand hygiene: hand hygiene performed prior to central venous catheter insertion  Location details: right internal jugular  Catheter type: triple lumen  Catheter size: 7.5 Fr  Ultrasound guidance: yes  Vessel Caliber: medium, patent, compressibility normal  Needle advanced into vessel with real time Ultrasound guidance.  Guidewire confirmed in vessel.  Sterile sheath used.  Manometry: Yes  Number of attempts: 2  Assessment: placement verified by x-ray  Complications: none  Specimens: No  Implants: No  Post-procedure: line sutured,  chlorhexidine patch,  sterile dressing applied and blood return through all ports  Complications: No        ED Vital Signs:  Vitals:    02/19/19 1937 02/19/19 1942 02/19/19 1949 " "02/19/19 1957   BP: (!) 158/86  (!) 184/88 (!) 181/73   Pulse:  91 96 89   Resp:  (!) 46 (!) 32 (!) 43   Temp:       TempSrc:       SpO2:  100% 100% 99%   Weight:       Height:        02/19/19 2000 02/19/19 2016 02/19/19 2042 02/19/19 2047   BP:  133/65 (!) 172/81 (!) 140/82   Pulse:  88  79   Resp:  (!) 37  (!) 31   Temp: 98.5 °F (36.9 °C)      TempSrc: Rectal      SpO2:  100%  100%   Weight:       Height:        02/19/19 2116 02/19/19 2147 02/19/19 2223 02/19/19 2316   BP: (!) 149/70 (!) 180/73  (!) 172/95   Pulse: 78 80 83 81   Resp: (!) 30 (!) 35  (!) 40   Temp:       TempSrc:       SpO2: 100% 100% 100% (!) 85%   Weight:       Height: 5' 1" (1.549 m)       02/19/19 2346 02/19/19 2354 02/20/19 0017   BP: (!) 123/58  (!) 110/58   Pulse: 70 68 66   Resp: (!) 23  (!) 22   Temp:      TempSrc:      SpO2: 100% 100% 100%   Weight:      Height:          Abnormal Lab Results:  Labs Reviewed   CBC W/ AUTO DIFFERENTIAL - Abnormal; Notable for the following components:       Result Value    WBC 24.28 (*)     RBC 3.84 (*)     Hemoglobin 8.2 (*)     Hematocrit 30.9 (*)     MCV 81 (*)     MCH 21.4 (*)     MCHC 26.5 (*)     RDW 19.3 (*)     Platelets 520 (*)     Gran # (ANC) 19.4 (*)     Gran% 82.2 (*)     Lymph% 15.9 (*)     Mono% 3.7 (*)     Platelet Estimate Clumped (*)     All other components within normal limits   COMPREHENSIVE METABOLIC PANEL - Abnormal; Notable for the following components:    Sodium 135 (*)     CO2 13 (*)     Glucose 311 (*)     Albumin 2.9 (*)     Alkaline Phosphatase 154 (*)     AST 55 (*)     Anion Gap 17 (*)     All other components within normal limits   TROPONIN I - Abnormal; Notable for the following components:    Troponin I 0.684 (*)     All other components within normal limits   LACTIC ACID, PLASMA - Abnormal; Notable for the following components:    Lactate (Lactic Acid) 10.8 (*)     All other components within normal limits    Narrative:     LA critical result(s) called and verbal readback " obtained from Ally Joe RN , 02/19/2019 20:21   URINALYSIS, REFLEX TO URINE CULTURE - Abnormal; Notable for the following components:    Specific Gravity, UA >=1.030 (*)     Protein, UA 2+ (*)     Glucose, UA Trace (*)     Occult Blood UA 1+ (*)     All other components within normal limits    Narrative:     Preferred Collection Type->Urine, Catheterized   LACTIC ACID, PLASMA - Abnormal; Notable for the following components:    Lactate (Lactic Acid) 4.2 (*)     All other components within normal limits    Narrative:     Obtain from central line or arterial line as peripheral  checks may be inaccurate.  LA critical result(s) called and verbal readback obtained from Kaitlyn Crawford RN , 02/19/2019 22:34   GLUCOSE, RANDOM - Abnormal; Notable for the following components:    Glucose 344 (*)     All other components within normal limits    Narrative:     Obtain from central line or arterial line as peripheral  checks may be inaccurate.  After baseline CMP  After baseline lab.   GLUCOSE, RANDOM - Abnormal; Notable for the following components:    Glucose 307 (*)     All other components within normal limits    Narrative:     Obtain from central line or arterial line as peripheral  checks may be inaccurate.   CK - Abnormal; Notable for the following components:     (*)     All other components within normal limits   TROPONIN I - Abnormal; Notable for the following components:    Troponin I 6.088 (*)     All other components within normal limits    Narrative:     Obtain from central line or arterial line as peripheral  checks may be inaccurate.  After baseline CMP  After baseline lab.   BASIC METABOLIC PANEL - Abnormal; Notable for the following components:    Potassium 3.4 (*)     CO2 16 (*)     Glucose 344 (*)     Calcium 7.7 (*)     All other components within normal limits    Narrative:     Obtain from central line or arterial line as peripheral  checks may be inaccurate.  After baseline CMP  After  baseline lab.   GLUCOSE, RANDOM - Abnormal; Notable for the following components:    Glucose 344 (*)     All other components within normal limits    Narrative:     Obtain from central line or arterial line as peripheral  checks may be inaccurate.  After baseline CMP  After baseline lab.   POCT GLUCOSE - Abnormal; Notable for the following components:    POCT Glucose 320 (*)     All other components within normal limits   ISTAT PROCEDURE - Abnormal; Notable for the following components:    POC PH 7.055 (*)     POC PCO2 58.6 (*)     POC PO2 473 (*)     POC HCO3 16.4 (*)     All other components within normal limits   CULTURE, BLOOD   CULTURE, BLOOD   CULTURE, RESPIRATORY   B-TYPE NATRIURETIC PEPTIDE   PROTIME-INR   APTT   DRUG SCREEN PANEL, URINE EMERGENCY   URINALYSIS MICROSCOPIC    Narrative:     Preferred Collection Type->Urine, Catheterized   PROCALCITONIN   NEURON SPECIFIC ENOLASE, SERUM   CK   PROCALCITONIN   MAGNESIUM    Narrative:     Obtain from central line or arterial line as peripheral  checks may be inaccurate.  After baseline CMP  After baseline lab.   PHOSPHORUS    Narrative:     Obtain from central line or arterial line as peripheral  checks may be inaccurate.  After baseline CMP  After baseline lab.   NEURON SPECIFIC ENOLASE, SERUM   GLUCOSE, RANDOM   LACTIC ACID, PLASMA        All Lab Results:  Results for orders placed or performed during the hospital encounter of 02/19/19   CBC auto differential   Result Value Ref Range    WBC 24.28 (H) 3.90 - 12.70 K/uL    RBC 3.84 (L) 4.00 - 5.40 M/uL    Hemoglobin 8.2 (L) 12.0 - 16.0 g/dL    Hematocrit 30.9 (L) 37.0 - 48.5 %    MCV 81 (L) 82 - 98 fL    MCH 21.4 (L) 27.0 - 31.0 pg    MCHC 26.5 (L) 32.0 - 36.0 g/dL    RDW 19.3 (H) 11.5 - 14.5 %    Platelets 520 (H) 150 - 350 K/uL    MPV 9.4 9.2 - 12.9 fL    Gran # (ANC) 19.4 (H) 1.8 - 7.7 K/uL    Lymph # 3.9 1.0 - 4.8 K/uL    Mono # 0.9 0.3 - 1.0 K/uL    Eos # 0.1 0.0 - 0.5 K/uL    Baso # 0.05 0.00 - 0.20 K/uL     Gran% 82.2 (H) 38.0 - 73.0 %    Lymph% 15.9 (L) 18.0 - 48.0 %    Mono% 3.7 (L) 4.0 - 15.0 %    Eosinophil% 0.3 0.0 - 8.0 %    Basophil% 0.2 0.0 - 1.9 %    Platelet Estimate Clumped (A)     Aniso Slight     Poik Slight     Poly Occasional     Hypo Occasional     Ovalocytes Occasional     Cantril Cells Occasional     Acanthocytes Present     Differential Method Automated    Comprehensive metabolic panel   Result Value Ref Range    Sodium 135 (L) 136 - 145 mmol/L    Potassium 5.0 3.5 - 5.1 mmol/L    Chloride 105 95 - 110 mmol/L    CO2 13 (L) 23 - 29 mmol/L    Glucose 311 (H) 70 - 110 mg/dL    BUN, Bld 15 8 - 23 mg/dL    Creatinine 1.0 0.5 - 1.4 mg/dL    Calcium 9.4 8.7 - 10.5 mg/dL    Total Protein 6.9 6.0 - 8.4 g/dL    Albumin 2.9 (L) 3.5 - 5.2 g/dL    Total Bilirubin 0.2 0.1 - 1.0 mg/dL    Alkaline Phosphatase 154 (H) 55 - 135 U/L    AST 55 (H) 10 - 40 U/L    ALT 17 10 - 44 U/L    Anion Gap 17 (H) 8 - 16 mmol/L    eGFR if African American >60 >60 mL/min/1.73 m^2    eGFR if non African American >60 >60 mL/min/1.73 m^2   Troponin I #1   Result Value Ref Range    Troponin I 0.684 (H) 0.000 - 0.026 ng/mL   B-Type natriuretic peptide (BNP)   Result Value Ref Range    BNP 19 0 - 99 pg/mL   Protime-INR   Result Value Ref Range    Prothrombin Time 10.7 9.0 - 12.5 sec    INR 1.0 0.8 - 1.2   APTT   Result Value Ref Range    aPTT 21.0 21.0 - 32.0 sec   Lactic acid, plasma   Result Value Ref Range    Lactate (Lactic Acid) 10.8 (HH) 0.5 - 2.2 mmol/L   Urinalysis, Reflex to Urine Culture Urine, Catheterized   Result Value Ref Range    Specimen UA Urine, Catheterized     Color, UA Yellow Yellow, Straw, Ana Cristina    Appearance, UA Clear Clear    pH, UA 6.0 5.0 - 8.0    Specific Gravity, UA >=1.030 (A) 1.005 - 1.030    Protein, UA 2+ (A) Negative    Glucose, UA Trace (A) Negative    Ketones, UA Negative Negative    Bilirubin (UA) Negative Negative    Occult Blood UA 1+ (A) Negative    Nitrite, UA Negative Negative    Urobilinogen, UA  Negative <2.0 EU/dL    Leukocytes, UA Negative Negative   Drug screen panel, emergency   Result Value Ref Range    Benzodiazepines Negative     Methadone metabolites Negative     Cocaine (Metab.) Negative     Opiate Scrn, Ur Presumptive Positive     Barbiturate Screen, Ur Negative     Amphetamine Screen, Ur Negative     THC Negative     Phencyclidine Negative     Creatinine, Random Ur 119.8 15.0 - 325.0 mg/dL    Toxicology Information SEE COMMENT    Urinalysis Microscopic   Result Value Ref Range    RBC, UA 3 0 - 4 /hpf    WBC, UA 0 0 - 5 /hpf    Bacteria, UA Occasional None-Occ /hpf    Squam Epithel, UA 6 /hpf    Hyaline Casts, UA 1 0-1/lpf /lpf    Microscopic Comment SEE COMMENT    Lactic acid, plasma   Result Value Ref Range    Lactate (Lactic Acid) 4.2 (HH) 0.5 - 2.2 mmol/L   Glucose, random   Result Value Ref Range    Glucose 344 (H) 70 - 110 mg/dL   Glucose, random   Result Value Ref Range    Glucose 307 (H) 70 - 110 mg/dL   Procalcitonin   Result Value Ref Range    Procalcitonin 0.07 <0.25 ng/mL   CK   Result Value Ref Range     (H) 20 - 180 U/L   Troponin I #2   Result Value Ref Range    Troponin I 6.088 (H) 0.000 - 0.026 ng/mL   Basic metabolic panel   Result Value Ref Range    Sodium 136 136 - 145 mmol/L    Potassium 3.4 (L) 3.5 - 5.1 mmol/L    Chloride 109 95 - 110 mmol/L    CO2 16 (L) 23 - 29 mmol/L    Glucose 344 (H) 70 - 110 mg/dL    BUN, Bld 17 8 - 23 mg/dL    Creatinine 0.9 0.5 - 1.4 mg/dL    Calcium 7.7 (L) 8.7 - 10.5 mg/dL    Anion Gap 11 8 - 16 mmol/L    eGFR if African American >60 >60 mL/min/1.73 m^2    eGFR if non African American >60 >60 mL/min/1.73 m^2   Magnesium   Result Value Ref Range    Magnesium 1.9 1.6 - 2.6 mg/dL   Phosphorus   Result Value Ref Range    Phosphorus 3.4 2.7 - 4.5 mg/dL   Glucose, random   Result Value Ref Range    Glucose 344 (H) 70 - 110 mg/dL   POCT glucose   Result Value Ref Range    POCT Glucose 320 (H) 70 - 110 mg/dL   ISTAT PROCEDURE   Result Value Ref  Range    POC PH 7.055 (LL) 7.35 - 7.45    POC PCO2 58.6 (HH) 35 - 45 mmHg    POC PO2 473 (H) 80 - 100 mmHg    POC HCO3 16.4 (L) 24 - 28 mmol/L    POC BE -14 -2 to 2 mmol/L    POC SATURATED O2 100 95 - 100 %    Rate 18     Sample ARTERIAL     Site LR     Allens Test Pass     DelSys Adult Vent     Mode AC/PRVC     Vt 400     PEEP 5     FiO2 100     Sp02 100          Imaging Results:  Imaging Results          X-Ray Chest AP Portable (Final result)  Result time 02/19/19 22:08:41    Final result by Nathaniel Harrison MD (02/19/19 22:08:41)                 Impression:      The endotracheal tube has been repositioned with its tip above the rula at T4 level.      Electronically signed by: Bud Harrison MD  Date:    02/19/2019  Time:    22:08             Narrative:    EXAMINATION:  XR CHEST AP PORTABLE    CLINICAL HISTORY:  Encounter for adjustment and management of vascular access device    TECHNIQUE:  Single frontal view of the chest was performed.    COMPARISON:  Previous study of this date is reviewed.    FINDINGS:  The endotracheal tube is been drawn back and now lies with its tip above the rula at the level of T4.  A central line is positioned via right jugular approach with tip in superior vena cava.  The heart is normal in size.  Lungs appear free of any active infiltrate, effusion, or pneumothorax.                               CT Head Without Contrast (Final result)  Result time 02/19/19 20:48:14    Final result by Nathaniel Harrison MD (02/19/19 20:48:14)                 Impression:      CT scan of the brain demonstrates no acute hemorrhage or infarct.  Note is made of fluid layering dependently within sphenoid sinuses and left maxillary sinus.    All CT scans at (this location) are performed using dose modulation techniques as appropriate to a performed exam including the following:  automated exposure control; adjustment of the mA and /or kV according to patient size (this includes techniques or  standardized protocols for targeted exams where dose is matched to indication/reason for exam: i.e. extremities or head); use of iterative reconstruction technique.      Electronically signed by: Bud Harrison MD  Date:    02/19/2019  Time:    20:48             Narrative:    EXAMINATION:  CT HEAD WITHOUT CONTRAST    CLINICAL HISTORY:  cardiac arrest;    TECHNIQUE:  Standard noncontrast CT of the brain.    All CT scans at this facility use dose modulation, iterative reconstruction and/or weight based dosing when appropriate to reduce radiation dose to as low as reasonably achievable.    COMPARISON:  None.    FINDINGS:  The ventricles are nonenlarged.  No acute hemorrhage edema or mass effect is identified.  No suspicious extra-axial fluid collections are demonstrated.  The ventricular system is within normal limits.    This pattern fluid layer at benign left maxillary sinus and within the sphenoid sinuses bilaterally.  Patchy mucosal thickening in the ethmoid air cells is present.  The mastoids are clear.    The skull is grossly normal.                               X-Ray Chest AP Portable (Final result)  Result time 02/19/19 20:09:35    Final result by Nathaniel Harrison MD (02/19/19 20:09:35)                 Impression:      Endotracheal tube is positioned inferiorly extending into the right mainstem bronchus.    Five 0 this finding at 8:07 p.m..  I discussed these findings with the emergency room physician Dr. Olsen at 8:09 p.m..      Electronically signed by: Bud Harrison MD  Date:    02/19/2019  Time:    20:09             Narrative:    EXAMINATION:  XR CHEST AP PORTABLE    CLINICAL HISTORY:  Chest Pain;    TECHNIQUE:  Single frontal view of the chest was performed.    COMPARISON:  None    FINDINGS:  Endotracheal tube is present which is advanced too inferior in position extending into the right mainstem bronchus.  The heart is normal in size.  The lungs appear clear and well expanded.  EKG leads overlie  the chest.                                        The EKG was ordered, reviewed, and independently interpreted by the ED provider. Dr. Em has reviewed both EKGs and does not feel that there is an acute STEMI.   Interpretation time: 1946  Rate: 98 BPM  Rhythm: Sinus rhythm with 1st degree AV block  Interpretation: Minimal voltage criteria for LVH, may be normal variant. ST elevation, consider inferior injury or acute infarct. Consider right ventricular involvement in acute interior infarct. No STEMI.      The Emergency Provider reviewed the vital signs and test results, which are outlined above.    ED Discussion     7:36 PM: Code STEMI called.    7:40 PM: Dr. Olsen discussed the pt's case with Dr. Em (cardiology). He does not feel as though the pt is having a STEMI. He recommends ABG to see if pt is acidotic and to call him back.    7:50 PM: 30mL/kg IVF have been administered within 3 hours of identification of SIRS criteria. Vital signs were reviewed and a focal sepsis perfusion assessment was performed.    7:54 PM: Dr. Olsen discussed the pt's case with Dr. Em (cardiology) who recommends medical management and cooling.     8:17 PM: Pt's ETT needs to be pulled back 2-3 cm per Dr. Corado.     8:25 PM: The pt's ETT tube has been pulled back 3 cm.    8:45 PM: Dr. Simms recommends cooling.    9:01 PM: Discussed pt's case on the phone with pt's brother, Marvin Ignacio. He can be reached at [phone]    9:30 PM: Discussed case with Dr. Simms ([facility]). Dr. Simms agrees with current care and management of pt and accepts admission. States to cover broadly with antibiotics as patient meets sepsis criteria however no clear source and likely secondary to cardiac arrest.   Admitting Service: Hospital medicine   Admitting Physician: Demi  Admit to: ICU    9:56 PM: Dr. Olsen discussed the pt's case with Dr. Simms ([facility]) who advises not to place pt on heparin due to low  hemoglobin and troponin being only 0.6    12:10 AM:  Repeat Troponin 6.6 hospital medicine notified will start heparin.       ED Medication(s):  Medications   piperacillin-tazobactam 4.5 g in dextrose 5 % 100 mL IVPB (ready to mix system) (4.5 g Intravenous New Bag 2/19/19 2107)   ciprofloxacin (CIPRO)400mg/200ml D5W IVPB 400 mg (400 mg Intravenous New Bag 2/19/19 2108)   acetaminophen oral solution 650 mg (650 mg Per NG tube Given 2/19/19 2353)   magnesium sulfate 2g in water 50mL IVPB (premix) (not administered)   cisatracurium injection 10 mg (not administered)   cisatracurium (NIMBEX) 200 mg in dextrose 5 % 100 mL infusion (not administered)   busPIRone tablet 30 mg (not administered)   fentaNYL 2500 mcg in D5W 250 mL infusion premix (titrating) (conc: 10 mcg/mL) (50 mcg/hr Intravenous New Bag 2/19/19 2250)   propofol (DIPRIVAN) 10 mg/mL infusion (30 mcg/kg/min × 96.9 kg Intravenous New Bag 2/19/19 2313)   lorazepam (ATIVAN) injection 2 mg (2 mg Intravenous Given 2/19/19 2215)   norepinephrine 4 mg in dextrose 5% 250 mL infusion (premix) (titrating) (not administered)   heparin 25,000 units in dextrose 5% (100 units/ml) IV bolus from bag INITIAL BOLUS (max bolus 4000 units) (not administered)   heparin 25,000 units in dextrose 5% 250 mL (100 units/mL) infusion LOW INTENSITY nomogram - [facility] (not administered)   heparin 25,000 units in dextrose 5% (100 units/ml) IV bolus from bag - ADDITIONAL PRN BOLUS - 60 units/kg (max bolus 4000 units) (not administered)   heparin 25,000 units in dextrose 5% (100 units/ml) IV bolus from bag - ADDITIONAL PRN BOLUS - 30 units/kg (max bolus 4000 units) (not administered)   insulin regular injection 10 Units (not administered)   sodium chloride 0.9% bolus 3,000 mL (0 mLs Intravenous Stopped 2/19/19 2149)   etomidate injection 20 mg (20 mg Intravenous Given 2/19/19 1942)   succinylcholine injection 100 mg (100 mg Intravenous Given 2/19/19 1941)   vancomycin (VANCOCIN) 1,500 mg in  dextrose 5 % 250 mL IVPB (0 mg Intravenous Stopped 2/19/19 0840)   busPIRone tablet 30 mg (30 mg Per NG tube Given 2/19/19 2259)   aspirin tablet 325 mg (325 mg Per OG tube Given 2/19/19 9675)             Medical Decision Making    Medical Decision Making:   Clinical Tests:   Lab Tests: Ordered and Reviewed  Radiological Study: Ordered and Reviewed  Medical Tests: Ordered and Reviewed           Scribe Attestation:   Scribe #1: I performed the above scribed service and the documentation accurately describes the services I performed. I attest to the accuracy of the note.    Attending:   Physician Attestation Statement for Scribe #1: I, Randa Olsen MD, personally performed the services described in this documentation, as scribed by Drake Neri, in my presence, and it is both accurate and complete.          Clinical Impression       ICD-10-CM ICD-9-CM   1. Cardiac arrest I46.9 427.5   2. STEMI (ST elevation myocardial infarction) I21.3 410.90   3. Abnormal ECG R94.31 794.31   4. Elevated troponin R74.8 790.6   5. Anemia, unspecified type D64.9 285.9   6. Metabolic acidosis E87.2 276.2   7. Respiratory failure, unspecified chronicity, unspecified whether with hypoxia or hypercapnia J96.90 518.81   8. Encounter for central line placement Z45.2 V58.81   9. SIRS (systemic inflammatory response syndrome) R65.10 995.90       Disposition:   Disposition: Admitted  Condition: Critical         Randa Olsen MD  02/20/19 0019

## 2019-02-20 NOTE — SUBJECTIVE & OBJECTIVE
Interval History: Hypothermic protocol in progress. Discussed with CC Team    Review of Systems   Unable to perform ROS: Intubated     Objective:     Vital Signs (Most Recent):  Temp: (!) 92.3 °F (33.5 °C) (02/20/19 1200)  Pulse: (!) 53 (02/20/19 1155)  Resp: (!) 22 (02/20/19 0445)  BP: (!) 79/40 (02/20/19 1000)  SpO2: 99 % (02/20/19 1155) Vital Signs (24h Range):  Temp:  [91.2 °F (32.9 °C)-98.5 °F (36.9 °C)] 92.3 °F (33.5 °C)  Pulse:  [] 53  Resp:  [8-54] 22  SpO2:  [85 %-100 %] 99 %  BP: ()/() 79/40     Weight: 96.9 kg (213 lb 11.2 oz)  Body mass index is 40.38 kg/m².    Intake/Output Summary (Last 24 hours) at 2/20/2019 1233  Last data filed at 2/20/2019 1200  Gross per 24 hour   Intake 4468.07 ml   Output 751 ml   Net 3717.07 ml      Physical Exam   Constitutional: She is oriented to person, place, and time. She appears well-developed and well-nourished. She appears ill. No distress. She is sedated and intubated.   HENT:   Head: Normocephalic and atraumatic.   Eyes: Pupils are equal, round, and reactive to light.   Sluggish   Neck: No JVD present. No tracheal deviation present.   Cardiovascular: Normal rate and intact distal pulses. An irregular rhythm present.   Murmur heard.   Systolic murmur is present with a grade of 3/6.  Pulmonary/Chest: Effort normal. She is intubated. No respiratory distress. She has no wheezes. She has rhonchi in the right middle field, the right lower field and the left middle field.   Abdominal: Soft. Bowel sounds are normal. She exhibits no distension.   Musculoskeletal: Normal range of motion. She exhibits no edema or tenderness.   Neurological: She is oriented to person, place, and time. She has normal reflexes. She is unresponsive. No cranial nerve deficit.   Skin: Skin is warm and dry. She is not diaphoretic. No erythema.   Psychiatric: She has a normal mood and affect. Her behavior is normal. Judgment and thought content normal.   Nursing note and vitals  reviewed.      Significant Labs:   BMP:   Recent Labs   Lab 02/20/19  0804   *   *   K 3.9      CO2 16*   BUN 18   CREATININE 0.8   CALCIUM 8.5*   MG 2.5     CBC:   Recent Labs   Lab 02/19/19 1937 02/20/19 0330 02/20/19  0615   WBC 24.28* 20.90*  --    HGB 8.2* 7.7* 7.4*   HCT 30.9* 26.6*  --    * 454*  --      CMP:   Recent Labs   Lab 02/19/19 1937 02/19/19 2317 02/20/19 0330 02/20/19  0804   *  --  136 136 135*   K 5.0  --  3.4* 3.0* 3.9     --  109 112* 110   CO2 13*  --  16* 16* 16*   *   < > 344*  344*  344* 110 148*   BUN 15  --  17 18 18   CREATININE 1.0  --  0.9 0.8 0.8   CALCIUM 9.4  --  7.7* 8.1* 8.5*   PROT 6.9  --   --   --   --    ALBUMIN 2.9*  --   --   --   --    BILITOT 0.2  --   --   --   --    ALKPHOS 154*  --   --   --   --    AST 55*  --   --   --   --    ALT 17  --   --   --   --    ANIONGAP 17*  --  11 8 9   EGFRNONAA >60  --  >60 >60 >60    < > = values in this interval not displayed.     Lactic Acid:   Recent Labs   Lab 02/19/19 2203 02/19/19 2328 02/20/19 0330   LACTATE 4.2* 4.0* 1.9     Lipase: No results for input(s): LIPASE in the last 48 hours.  Troponin:   Recent Labs   Lab 02/19/19 1937 02/19/19 2317   TROPONINI 0.684* 6.088*     Urine Studies:   Recent Labs   Lab 02/19/19 1949   COLORU Yellow   APPEARANCEUA Clear   PHUR 6.0   SPECGRAV >=1.030*   PROTEINUA 2+*   GLUCUA Trace*   KETONESU Negative   BILIRUBINUA Negative   OCCULTUA 1+*   NITRITE Negative   UROBILINOGEN Negative   LEUKOCYTESUR Negative   RBCUA 3   WBCUA 0   BACTERIA Occasional   SQUAMEPITHEL 6   HYALINECASTS 1     All pertinent labs within the past 24 hours have been reviewed.    Significant Imaging: I have reviewed all pertinent imaging results/findings within the past 24 hours.

## 2019-02-20 NOTE — PLAN OF CARE
HAN spoke with Dr. Whyte and he stated the family has decided to with draw care and she will be comfort measures only. If patient has not  , CM to discuss transitional care to hospice 19

## 2019-02-20 NOTE — HOSPITAL COURSE
Arrived to ICU with OETT on mechanical vent; TTM initiated. Art line placed.on propofol and fentanyl sedation for vent control  2/20 intubated on [facility] vent with TTM at goal temp; RN reported early am withdrawal on rt and +pupillary light response; h/h decreased.  WBC down 20K

## 2019-02-20 NOTE — ASSESSMENT & PLAN NOTE
Troponin elevated 0.684, likely secondary to cardiac arrest.  Will trend cardiac enzymes.  Cardiology consulted    2/20  Last Trop 6.088

## 2019-02-20 NOTE — ASSESSMENT & PLAN NOTE
-Unclear etiology  -No apparent cardiac history  -No family present  -Troponin 0.684>6.088  -Elevation secondary to demand ischemia; EKG shows non-specific ST-T changes, no STEMI  -Continue ASA  -Continue heparin gtt for 48 hours  -Will not add Plavix at present time given anemia/need for transfusion  -Add BB once off pressor support  -Add atorvastatin 40 mg nightly  -2D echo showed normal EF, RVE with mildly depressed RV systolic function  -On hypothermia protocol; further workup pending clinical course

## 2019-02-21 VITALS
WEIGHT: 213.69 LBS | DIASTOLIC BLOOD PRESSURE: 47 MMHG | HEART RATE: 111 BPM | HEIGHT: 61 IN | RESPIRATION RATE: 40 BRPM | BODY MASS INDEX: 40.35 KG/M2 | SYSTOLIC BLOOD PRESSURE: 104 MMHG | OXYGEN SATURATION: 98 % | TEMPERATURE: 98 F

## 2019-02-21 LAB — NSE SERPL-MCNC: NORMAL UG/L

## 2019-02-21 PROCEDURE — 63600175 PHARM REV CODE 636 W HCPCS: Performed by: INTERNAL MEDICINE

## 2019-02-21 RX ADMIN — MORPHINE SULFATE 4 MG: 4 INJECTION INTRAVENOUS at 07:02

## 2019-02-21 RX ADMIN — MORPHINE SULFATE 4 MG: 4 INJECTION INTRAVENOUS at 06:02

## 2019-02-21 RX ADMIN — LORAZEPAM 2 MG: 2 INJECTION INTRAMUSCULAR; INTRAVENOUS at 11:02

## 2019-02-21 RX ADMIN — MORPHINE SULFATE 4 MG: 4 INJECTION INTRAVENOUS at 08:02

## 2019-02-21 RX ADMIN — LORAZEPAM 2 MG: 2 INJECTION INTRAMUSCULAR; INTRAVENOUS at 03:02

## 2019-02-21 RX ADMIN — MORPHINE SULFATE 4 MG: 4 INJECTION INTRAVENOUS at 11:02

## 2019-02-21 RX ADMIN — MORPHINE SULFATE 4 MG: 4 INJECTION INTRAVENOUS at 01:02

## 2019-02-21 RX ADMIN — MORPHINE SULFATE 4 MG: 4 INJECTION INTRAVENOUS at 09:02

## 2019-02-21 RX ADMIN — LORAZEPAM 2 MG: 2 INJECTION INTRAMUSCULAR; INTRAVENOUS at 07:02

## 2019-02-21 RX ADMIN — MORPHINE SULFATE 4 MG: 4 INJECTION INTRAVENOUS at 03:02

## 2019-02-21 RX ADMIN — LORAZEPAM 2 MG: 2 INJECTION INTRAMUSCULAR; INTRAVENOUS at 06:02

## 2019-02-21 RX ADMIN — LORAZEPAM 2 MG: 2 INJECTION INTRAMUSCULAR; INTRAVENOUS at 01:02

## 2019-02-21 RX ADMIN — LORAZEPAM 2 MG: 2 INJECTION INTRAMUSCULAR; INTRAVENOUS at 08:02

## 2019-02-21 RX ADMIN — LORAZEPAM 2 MG: 2 INJECTION INTRAMUSCULAR; INTRAVENOUS at 09:02

## 2019-02-21 NOTE — PLAN OF CARE
"Problem: Adult Inpatient Plan of Care  Goal: Plan of Care Review  Outcome: Ongoing (interventions implemented as appropriate)  Vitals signs closely monitored. Fall precautions in place. Patient turned every two hours. Pain assessed every two hours. Safety promoted. Infection risks reduced.     0700 neuro check pt had sluggish pupils and a with draw to pain. 1100 neuro check revealed a GCS of 3 (with sedation held for over an hour). Pt SB with a lot of ectopy and short runs of vtach. Family requested that pt become a DNR and wanted comfort measures. Stating "she would have never wanted this". Cooling was stopped at 1600. Palliative withdraw at 1815. Propofol, fentanyl, and heparin stopped.ETT on OGT removed. Ativan and morphine given for comfort. Brother at bedside. Support care provided.       "

## 2019-02-21 NOTE — NURSING
Zane here to transport pt to [facility] hospice. Pt in NAD. All belongings with pt. Aunt at bedside

## 2019-02-21 NOTE — LOPA/MORA/SWTA/AOC/AEB
Mingo Dominguez with [facility] called for update on pt, update given, [facility] signed off, please call with TOD. s

## 2019-02-21 NOTE — NURSING
1945 pt noted to have seizure like activity with eyes rolled back and trembling of facial muscles and mouth, no response to deep pain, KIERA Gauthier notified, did not visualize pt at bs, dose not think pt is having seizure, ativan and morphine given per prn order, pt eyes briefly returned to midline after ativan and brief stop in facial twitching, also of note is brief change in paradoxical breathing pattern, will continue to monitor and administer additional doses and contact eicu if needed.

## 2019-02-21 NOTE — DISCHARGE SUMMARY
[facility] Hospital Medicine  Discharge Summary      Patient Name: Elenita Degroot  MRN: [id number]  Admission Date: 2/19/2019  Hospital Length of Stay: 2 days  Discharge Date and Time:  02/21/2019 3:26 PM  Attending Physician: No att. providers found   Discharging Provider: Arcelia Acosta MD  Primary Care Provider: Yemi Wynn      HPI:   Ms. Degroot was brought to the ED status post cardiac arrest at home (witnessed by her brother).  Between fire department and EMS, patient received four rounds of epinephrine and three shocks along with amiodarone.  No family at the bedside to obtain any further information.  Patient was intubated in the field.  In the ED, CT head negative for ischemia.  Initial EKG revealed STEMI, reviewed by Dr. Em, no evidence of STEMI.  Laboratory workup reveals lactic acid of 10.8 initially, improved to 4.2.  WBC 99380, hemoglobin 8.2, platelets 520.  Troponin 0.684.  Cardiology recommended hypothermia protocol as patient meets criteria.  Patient is currently twitching with tactile stimulus.  No known history of seizures.  Dr. Olsen was able to get in touch with the patient's brother, who stated the patient is a full code at this time.  Central line placed and patient started on hypothermia protocol.    Procedure(s) (LRB):  CATHETERIZATION, HEART, LEFT (Left)      Hospital Course:   2/20  Patient s/p Cardiac Arrest with ROSC. Patient currently on Artic Sun cooling protocol. Tolerating therapy. Case discussed with CC Team.NSTEMI per Cardiology.  2/21:Pt made DNR and extubated pt. Now comfort care only. Seen and examined. Deemed stable to be d/c to be transferred [facility].     Consults:   Consults (From admission, onward)        Status Ordering Provider     Inpatient consult to Cardiology  Once     Provider:  Rodri Em MD    Completed PASTORA ATKINSON     Inpatient consult to Pulmonology  Once     Provider:  Sandeep Sunshine MD     Completed UMA WHEELER     Inpatient consult to Registered Dietitian/Nutritionist  Once     Provider:  (Not yet assigned)    Completed UMA WHEELER     Inpatient consult to Social Work  Once     Provider:  (Not yet assigned)    Completed ROSEMARIE HUERTA consult to case management  Once     Provider:  (Not yet assigned)    Completed UMA WHEELER          No new Assessment & Plan notes have been filed under this hospital service since the last note was generated.  Service: Hospital Medicine    Final Active Diagnoses:    Diagnosis Date Noted POA    PRINCIPAL PROBLEM:  Cardiac arrest [I46.9] 02/19/2019 Yes    On mechanically assisted ventilation [Z99.11] 02/20/2019 Not Applicable    Lactic acidosis [E87.2] 02/19/2019 Yes    Elevated troponin [R74.8] 02/19/2019 Yes    Leucocytosis [D72.829] 02/19/2019 Yes    Acute anoxic encephalopathy [G93.1] 02/19/2019 Yes      Problems Resolved During this Admission:       Discharged Condition: stable    Disposition: Hospice/Home    Follow Up:    Patient Instructions:      Diet Adult Regular     Activity as tolerated       Significant Diagnostic Studies: Labs:   CMP   Recent Labs   Lab 02/19/19  1937  02/20/19  0330 02/20/19  0804 02/20/19  1156   *   < > 136 135* 133*  133*   K 5.0   < > 3.0* 3.9 3.8  3.8      < > 112* 110 111*  111*   CO2 13*   < > 16* 16* 17*  17*   *   < > 110 148* 119*  119*   BUN 15   < > 18 18 17  17   CREATININE 1.0   < > 0.8 0.8 0.6  0.6   CALCIUM 9.4   < > 8.1* 8.5* 7.7*  7.7*   PROT 6.9  --   --   --  5.1*   ALBUMIN 2.9*  --   --   --  2.3*   BILITOT 0.2  --   --   --  0.2   ALKPHOS 154*  --   --   --  107   AST 55*  --   --   --  103*   ALT 17  --   --   --  32   ANIONGAP 17*   < > 8 9 5*  5*   ESTGFRAFRICA >60   < > >60 >60 >60  >60   EGFRNONAA >60   < > >60 >60 >60  >60    < > = values in this interval not displayed.    and CBC   Recent Labs   Lab 02/19/19  1937 02/20/19  0330 02/20/19  0615   WBC 24.28*  20.90*  --    HGB 8.2* 7.7* 7.4*   HCT 30.9* 26.6*  --    * 454*  --        Pending Diagnostic Studies:     None         Medications:  Reconciled Home Medications:      Medication List      You have not been prescribed any medications.         Indwelling Lines/Drains at time of discharge:   Lines/Drains/Airways     Central Venous Catheter Line                 Percutaneous Central Line Insertion/Assessment - triple lumen  02/19/19 2150 right internal jugular 1 day          Drain                 Urethral Catheter 02/19/19 2233 Temperature probe 1 day                Time spent on the discharge of patient: >30 minutes  Patient was seen and examined on the date of discharge and determined to be suitable for discharge.    Critical care time spent on the evaluation and treatment of severe organ dysfunction, review of pertinent labs and imaging studies, discussions with consulting providers and discussions with patient/family: >30 minutes.     Arcelia Acosta MD  Department of Hospital Medicine  [facility]

## 2019-02-21 NOTE — PLAN OF CARE
Cm spoke with the POA regarding the next steps to comfort care since the patient has not . POA verbalized understanding. CM verbally gave choices of inpatient Hospice fac. Preference form witness as Marvin Cruz ( POA ) gave verbal consent for [facility].  POA has returned back to [place]  Because he had a mtg that could not be cancelled. CM arranged for (am call with hospice for verbal consents. CM called Liaison Kassie and she stated she will call and get consents.  Referral placed in [facility]. Referral accepted  1030-  place order for [facility]. Katerine YU to call report to [facility] [phone].     19 2724   Post-Acute Status   Post-Acute Authorization Home Health/Hospice   Home Health/Hospice Status Referrals Sent

## 2019-02-21 NOTE — PLAN OF CARE
Problem: Adult Inpatient Plan of Care  Goal: Plan of Care Review  Outcome: Revised   02/21/19 0054   Plan of Care Review   Plan of Care Reviewed With patient;family   Progress declining   Outcome Summary palliative care       Comments: Pt made DNR today per family, care changed to palliative, ativan and morphine given for comfort, pt brother Marvin called and updated on patient condition, questions answered, pt breathing remains irregular and labored, still with almost constant facial twitching, uop cloudy pink, sediment. Temp has returned to normal, decrease in oral and upper respiratory secretions since giving robuniol.

## 2019-02-22 LAB
BACTERIA SPEC AEROBE CULT: NORMAL
BACTERIA SPEC AEROBE CULT: NORMAL
GRAM STN SPEC: NORMAL

## 2019-02-25 LAB
BACTERIA BLD CULT: NORMAL
BACTERIA BLD CULT: NORMAL

## 2019-02-25 NOTE — PHYSICIAN QUERY
"PT Name: Elenita Degroot  MR #: [id number]    Physician Query Form - Consultant Diagnosis Clarification     CDS/: Jannie Galvan RN             Contact information: [phone]  This form is a permanent document in the medical record.     Query Date: February 25, 2019      By submitting this query, we are merely seeking further clarification of documentation.  Please utilize your independent clinical judgment when addressing the question(s) below.      The Medical record contains the following:   Diagnosis Supporting Clinical Information Location in Medical Record   morbid obesity    Temp: (!) 93.6 °F (34.2 °C)   Height Method: Estimated   Height: 5' 1" (154.9 cm)   Height (inches): 61 in   Weight Method: Bed Scale   Weight: 96.9 kg (213 lb 11.2 oz)   Weight (lb): 213.7 lb   Ideal Body Weight (IBW), Female: 105 lb   % Ideal Body Weight, Female (lb): 203.52 lb   BMI (Calculated): 40.5   BMI Grade: greater than 40 - morbid obesity    Consult 2/20       Nutrition         Do you agree with the Consultants diagnosis of _________morbid obesity __________________________?    [ x ] Yes   [  ] No   [  ] Clinically insignificant   [  ] Other/Clarification of findings:   [  ] Clinically undetermined                      "

## 2019-02-25 NOTE — PHYSICIAN QUERY
PT Name: Elenita Degroot  MR #: [id number]    Physician Query Form - Respiratory Condition Clarification      CDS/: Jnanie Galvan RN             Contact information: [phone]    This form is a permanent document in the medical record.    Query Date: February 25, 2019    By submitting this query, we are merely seeking further clarification of documentation. Please utilize your independent clinical judgment when addressing the question(s) below.    The Medical record contains the following   Indicators   Supporting Clinical Findings Location in Medical Record   x   SOB, MATTA, Wheezing, Productive Cough, Use of Accessory Muscles, etc. She is intubated. She has decreased breath sounds in the right lower field and the left lower field. She has wheezes in the right upper field, the right middle field , the left upper field and the left middle field.    Consult 2/20       Pulmonology    x   Acute/Chronic Illness Cardiac arrest   On mechanically assisted ventilation        Lactic acidosis   Elevated troponin   Leucocytosis   Acute anoxic encephalopathy      Discharge Summary 2/21      Radiology Findings     x   Respiratory Distress or Failure Critical secondary to encephalopathy, resp failure post cardiopulmonary arrest;    Consult 2/20       Pulmonology           x   Hypoxia or Hypercapnia Elevated troponin -Secondary to demand ischemia from cardiac arrest, anemia, hypoxia    Consult 2/20       Cardiology    x   RR         ABGs         O2 sat      PH                    7.055*       PCO2               58.6*        HCO3               16.4*   POCSATURATED 100        BE                     -14    H&P 2/19   x   BiPAP/Intubation Patient was later intubated in the ED with an 8.0 ETT and central line was placed.       Arrived to ICU with OETT on mechanical vent; TTM initiated.      Consult 2/20      Critical Care Medicine    Consult 2/20      Critical Care Medicine        Supplemental O2        Home O2, Oxygen Dependence         Treatment     x   Other Acute anoxic encephalopathy   High likelihood of anoxic brain injury given approximate down time of 25 min.   CT head negative for ischemia.   Re-evaluate neurological status after rewarming.      H&P 2/19     Respiratory failure can be acute, chronic or both. It is generally further specified as hypoxic, hypercapnic or both. Lastly, it is important to identify an etiology, if known or suspected.   References::  https://www.acphospitalist.org/archives/2013/10/coding.htm http://[employer]/acute-respiratory-failure-know     The clinical guidelines noted below are only system guidelines, and do not replace the providers clinical judgment.    Provider, please specify diagnosis or diagnoses associated with above clinical findings.   [ x  ] Acute Respiratory Failure with Hypoxia - ABG pO2 < 60 mmHg or O2 sat of 88% on RA and respiratory symptoms documented   [   ] Other Acute Respiratory Failure     [   ] Other Respiratory Diagnosis (please specify): _________________________________   [   ]  Clinically Undetermined       Please document in your progress notes daily for the duration of treatment until resolved and include in your discharge summary.

## 2019-02-25 NOTE — PHYSICIAN QUERY
PT Name: Elenita Degroot  MR #: [id number]     PHYSICIAN QUERY -  ELECTROLYTE CLARIFICATION      CDS/: Jannie Galvan RN             Contact information: [phone]  This form is a permanent document in the medical record.     Query Date: February 25, 2019    By submitting this query, we are merely seeking further clarification of documentation to reflect the severity of illness of your patient. Please utilize your independent clinical judgment when addressing the question(s) below.    The Medical record reflects the following:     Indicators   Supporting Clinical Findings Location in Medical Record   x Lab Value(s) Potassium 3.4-> 3.0-> 3.9    Phosphorus 3.4-> 2.3-> 3.7 Lab 2/19, 2/20, 2/20    Lab 2/19, 2/20, 2/20     x Treatment                                 Medication Potassium chloride  20 mEq IV      Potassium phosphate  IV  MAR 2/20    MAR 2/20    Other       Provider, please specify the diagnosis or diagnoses that correspond(s) to the above indicators.     Keith all that apply:    [  x ] Hypokalemia   [ x  ] Hypophosphatemia   [   ] Other electrolyte disturbance (please specify): _______   [   ]  Clinically Undetermined       Please document in your progress notes daily for the duration of treatment until resolved, and include in your discharge summary.

## 2019-02-25 NOTE — PHYSICIAN QUERY
PT Name: Elenita Degroot  MR #: [id number]     Physician Query Form - Documentation Clarification      CDS/: Jannie Galvan RN              Contact information: [phone]    This form is a permanent document in the medical record.     Query Date: February 25, 2019    By submitting this query, we are merely seeking further clarification of documentation. Please utilize your independent clinical judgment when addressing the question(s) below.    The Medical record reflects the following:    Supporting Clinical Findings Location in Medical Record   2/20   Patient s/p Cardiac Arrest with ROSC. Patient currently on Artic Sun cooling protocol. Tolerating therapy. Case discussed with CC Team.NSTEMI per Cardiology.          Discharge Summary  2/21     -Troponin 0.684>6.088   -Elevation secondary to demand ischemia; EKG shows non-specific ST-T changes, no STEMI       Elevated troponin   -Secondary to demand ischemia from cardiac arrest, anemia, hypoxia   -Continue to trend   -Continue ASA, heparin, statin   -Add BB once off pressor support      Consult 2/20       Cardiology            Consult 2/20       Cardiology                                                                            Doctor, Please specify diagnosis or diagnoses associated with above clinical findings.    Please clarify the diagnosis associated with the elevated troponin.     Provider Use Only    [   ]  Demand only    [  x ]  Demand ischemia NSTEMI type 2 MI    [   ] NSTEMI    [   ] Other________________                                                                                                               [  ] Clinically Undetermined

## 2019-02-25 NOTE — PHYSICIAN QUERY
PT Name: Elenita Degroot  MR #: [id number]    Physician Query Form - Perfusion Diagnosis Clarification     CDS/: Jannie Galvan RN             Contact information:[phone]  This form is a permanent document in the medical record.     Query Date: February 25, 2019    By submitting this query, we are merely seeking further clarification of documentation. Please utilize your independent clinical judgment when addressing the question(s) below.    The medical record contains the following:    Indicators   Supporting Clinical Findings   Location in Medical Record   x Acute Illness (e.g. AMI, Sepsis, etc.) .NSTEMI per Cardiology.       Leucocytosis   No evidence of sepsis, elevated WBC likely reactive the setting of cardiac arrest.       Elevated troponin   Secondary to demand ischemia from cardiac arrest, anemia, hypoxia      Discharge Summary 2/21      Progress Note 2/20       Hospital Medicine         Consult 2/20       Cardiology                 x Acidosis documented Lactic acidosis   Lactic acid 10.8, improved to 4.2.   No clear evidence of sepsis, elevated lactic acid likely secondary to hypoperfusion from sepsis cardiac arrest.    H&P 2/19   x ABGs / Labs Hgb  8.2-> 7.7  Hct  30.9--> 26.6      Troponin 0.684-> 6.088       Lab 2/19, 2/20        Lab 2/19, 2/19   x Vital Signs Vital Signs (24h Range):   Temp:  [91.2 °F (32.9 °C)-98.5 °F (36.9 °C)] 92.3 °F (33.5 °C)   Pulse:  [] 53   Resp:  [8-54] 22   SpO2:  [85 %-100 %] 99 %   BP: ()/() 79/40    Progress Note 2/20       Hospital Medicine    Hypotension or Low Blood Pressure documented     x Altered Mental Status or Confusion Acute anoxic encephalopathy    H&P 2/19    Diaphoresis, Cold Extremities or Cyanosis      Oliguria     x Medication/Treatment:  -Vasopressors  -Inotropic Drugs  -IV Fluids  -Cardiac Assist Devices  -Hemodynamic Monitoring  -Blood/Blood Products Sodium chloride bolus       Norepinephrine IV  continuous MAR 2/19, 2/20      MAR 2/20    x Other: Patient seen and examined today, remains intubated and sedated. On pressor support. No prior cardiac history. No family present at  time of exam. Chart reviewed. Lactic acid has normalized. Troponin 0.684>6.088. H and H remains low. 2D echo reviewed and showed normal EF, RVE with mildly depressed systolic function, and pulmonary HTN.       -Will not add Plavix at present time given anemia/need for transfusion      Consult  2/20       Cardiology                   Consult 2/20       Cardiology            Provider, please specify diagnosis or diagnoses associated with above clinical findings.    [  x ] Cardiogenic Shock   [   ] Hemorrhagic Shock   [   ] Hypovolemic Shock   [   ] Septic Shock   [   ] Other Shock (please specify):   [   ] Shock Unspecified   [   ] Other Condition (please specify):   [  ] Clinically Undetermined         Please document in your progress notes daily for the duration of treatment until resolved and include in your discharge summary.